# Patient Record
Sex: FEMALE | Race: WHITE | Employment: FULL TIME
[De-identification: names, ages, dates, MRNs, and addresses within clinical notes are randomized per-mention and may not be internally consistent; named-entity substitution may affect disease eponyms.]

---

## 2017-01-04 ENCOUNTER — OFFICE VISIT (OUTPATIENT)
Dept: OBGYN | Facility: CLINIC | Age: 57
End: 2017-01-04

## 2017-01-04 VITALS
DIASTOLIC BLOOD PRESSURE: 78 MMHG | BODY MASS INDEX: 49.87 KG/M2 | WEIGHT: 254 LBS | SYSTOLIC BLOOD PRESSURE: 124 MMHG | HEIGHT: 60 IN

## 2017-01-04 DIAGNOSIS — Z01.419 PAP SMEAR, AS PART OF ROUTINE GYNECOLOGICAL EXAMINATION: Primary | ICD-10-CM

## 2017-01-04 DIAGNOSIS — Z12.31 VISIT FOR SCREENING MAMMOGRAM: ICD-10-CM

## 2017-01-04 DIAGNOSIS — N92.1 MENORRHAGIA WITH IRREGULAR CYCLE: ICD-10-CM

## 2017-01-04 PROCEDURE — 99396 PREV VISIT EST AGE 40-64: CPT | Performed by: NURSE PRACTITIONER

## 2017-01-04 ASSESSMENT — ENCOUNTER SYMPTOMS
SHORTNESS OF BREATH: 0
BLOOD IN STOOL: 0
VOMITING: 0
COUGH: 0
ABDOMINAL PAIN: 0
CHEST TIGHTNESS: 0
WHEEZING: 0
BACK PAIN: 0
CONSTIPATION: 0
PHOTOPHOBIA: 0
NAUSEA: 0
ABDOMINAL DISTENTION: 0
DIARRHEA: 0
COLOR CHANGE: 0

## 2017-01-17 ENCOUNTER — TELEPHONE (OUTPATIENT)
Dept: OBGYN | Facility: CLINIC | Age: 57
End: 2017-01-17

## 2017-01-18 ENCOUNTER — TELEPHONE (OUTPATIENT)
Dept: OBGYN | Facility: CLINIC | Age: 57
End: 2017-01-18

## 2017-01-18 DIAGNOSIS — R73.01 ELEVATED FASTING BLOOD SUGAR: Primary | ICD-10-CM

## 2017-01-19 PROBLEM — R73.01 ELEVATED FASTING BLOOD SUGAR: Status: ACTIVE | Noted: 2017-01-19

## 2017-01-23 ENCOUNTER — TELEPHONE (OUTPATIENT)
Dept: OBGYN | Facility: CLINIC | Age: 57
End: 2017-01-23

## 2017-03-30 ENCOUNTER — HOSPITAL ENCOUNTER (OUTPATIENT)
Age: 57
Discharge: HOME | End: 2017-03-30
Payer: COMMERCIAL

## 2017-03-30 DIAGNOSIS — I10 HYPERTENSION, BENIGN: Chronic | ICD-10-CM

## 2017-03-30 DIAGNOSIS — R73.01 ELEVATED FASTING BLOOD SUGAR: ICD-10-CM

## 2017-03-30 LAB
CHOLESTEROL/HDL RATIO: 3.2
CHOLESTEROL: 200 MG/DL
ESTIMATED AVERAGE GLUCOSE: 108 MG/DL
HBA1C MFR BLD: 5.4 % (ref 4–6)
HDLC SERPL-MCNC: 63 MG/DL
LDL CHOLESTEROL: 121 MG/DL (ref 0–130)
TRIGL SERPL-MCNC: 82 MG/DL
VLDLC SERPL CALC-MCNC: ABNORMAL MG/DL (ref 1–30)

## 2017-03-30 PROCEDURE — 80061 LIPID PANEL: CPT

## 2017-03-30 PROCEDURE — 83036 HEMOGLOBIN GLYCOSYLATED A1C: CPT

## 2017-03-30 PROCEDURE — 36415 COLL VENOUS BLD VENIPUNCTURE: CPT

## 2018-09-25 ENCOUNTER — HOSPITAL ENCOUNTER (OUTPATIENT)
Age: 58
Discharge: HOME | End: 2018-09-25
Payer: COMMERCIAL

## 2018-09-25 DIAGNOSIS — R53.83 FATIGUE, UNSPECIFIED TYPE: ICD-10-CM

## 2018-09-25 DIAGNOSIS — I10 HYPERTENSION, BENIGN: Chronic | ICD-10-CM

## 2018-09-25 DIAGNOSIS — R73.9 HYPERGLYCEMIA: ICD-10-CM

## 2018-09-25 LAB
ABSOLUTE EOS #: 0.2 K/UL (ref 0–0.4)
ABSOLUTE IMMATURE GRANULOCYTE: ABNORMAL K/UL (ref 0–0.3)
ABSOLUTE LYMPH #: 1.6 K/UL (ref 1–4.8)
ABSOLUTE MONO #: 0.7 K/UL (ref 0.1–1.3)
ALBUMIN SERPL-MCNC: 4.1 G/DL (ref 3.5–5.2)
ALBUMIN/GLOBULIN RATIO: ABNORMAL (ref 1–2.5)
ALP BLD-CCNC: 108 U/L (ref 35–104)
ALT SERPL-CCNC: 37 U/L (ref 5–33)
ANION GAP SERPL CALCULATED.3IONS-SCNC: 13 MMOL/L (ref 9–17)
AST SERPL-CCNC: 21 U/L
BASOPHILS # BLD: 1 % (ref 0–2)
BASOPHILS ABSOLUTE: 0 K/UL (ref 0–0.2)
BILIRUB SERPL-MCNC: 0.46 MG/DL (ref 0.3–1.2)
BUN BLDV-MCNC: 14 MG/DL (ref 6–20)
BUN/CREAT BLD: ABNORMAL (ref 9–20)
CALCIUM SERPL-MCNC: 10.6 MG/DL (ref 8.6–10.4)
CHLORIDE BLD-SCNC: 101 MMOL/L (ref 98–107)
CHOLESTEROL/HDL RATIO: 3.9
CHOLESTEROL: 193 MG/DL
CO2: 27 MMOL/L (ref 20–31)
CREAT SERPL-MCNC: 0.51 MG/DL (ref 0.5–0.9)
DIFFERENTIAL TYPE: ABNORMAL
EOSINOPHILS RELATIVE PERCENT: 3 % (ref 0–4)
ESTIMATED AVERAGE GLUCOSE: 117 MG/DL
GFR AFRICAN AMERICAN: >60 ML/MIN
GFR NON-AFRICAN AMERICAN: >60 ML/MIN
GFR SERPL CREATININE-BSD FRML MDRD: ABNORMAL ML/MIN/{1.73_M2}
GFR SERPL CREATININE-BSD FRML MDRD: ABNORMAL ML/MIN/{1.73_M2}
GLUCOSE BLD-MCNC: 111 MG/DL (ref 70–99)
HBA1C MFR BLD: 5.7 % (ref 4–6)
HCT VFR BLD CALC: 44.7 % (ref 36–46)
HDLC SERPL-MCNC: 50 MG/DL
HEMOGLOBIN: 15.1 G/DL (ref 12–16)
IMMATURE GRANULOCYTES: ABNORMAL %
LDL CHOLESTEROL: 117 MG/DL (ref 0–130)
LYMPHOCYTES # BLD: 23 % (ref 24–44)
MCH RBC QN AUTO: 29.9 PG (ref 26–34)
MCHC RBC AUTO-ENTMCNC: 33.7 G/DL (ref 31–37)
MCV RBC AUTO: 88.9 FL (ref 80–100)
MONOCYTES # BLD: 10 % (ref 1–7)
NRBC AUTOMATED: ABNORMAL PER 100 WBC
PDW BLD-RTO: 14.1 % (ref 11.5–14.9)
PLATELET # BLD: 224 K/UL (ref 150–450)
PLATELET ESTIMATE: ABNORMAL
PMV BLD AUTO: 8.7 FL (ref 6–12)
POTASSIUM SERPL-SCNC: 4.2 MMOL/L (ref 3.7–5.3)
RBC # BLD: 5.03 M/UL (ref 4–5.2)
RBC # BLD: ABNORMAL 10*6/UL
SEG NEUTROPHILS: 63 % (ref 36–66)
SEGMENTED NEUTROPHILS ABSOLUTE COUNT: 4.4 K/UL (ref 1.3–9.1)
SODIUM BLD-SCNC: 141 MMOL/L (ref 135–144)
TOTAL PROTEIN: 7.5 G/DL (ref 6.4–8.3)
TRIGL SERPL-MCNC: 132 MG/DL
VLDLC SERPL CALC-MCNC: NORMAL MG/DL (ref 1–30)
WBC # BLD: 7 K/UL (ref 3.5–11)
WBC # BLD: ABNORMAL 10*3/UL

## 2018-09-25 PROCEDURE — 83036 HEMOGLOBIN GLYCOSYLATED A1C: CPT

## 2018-09-25 PROCEDURE — 36415 COLL VENOUS BLD VENIPUNCTURE: CPT

## 2018-09-25 PROCEDURE — 80053 COMPREHEN METABOLIC PANEL: CPT

## 2018-09-25 PROCEDURE — 85025 COMPLETE CBC W/AUTO DIFF WBC: CPT

## 2018-09-25 PROCEDURE — 80061 LIPID PANEL: CPT

## 2018-10-01 ENCOUNTER — HOSPITAL ENCOUNTER (OUTPATIENT)
Dept: WOMENS IMAGING | Age: 58
Discharge: HOME | End: 2018-10-03
Payer: COMMERCIAL

## 2018-10-01 DIAGNOSIS — Z12.31 SCREENING MAMMOGRAM, ENCOUNTER FOR: ICD-10-CM

## 2018-10-01 PROCEDURE — 77067 SCR MAMMO BI INCL CAD: CPT

## 2019-10-01 ENCOUNTER — HOSPITAL ENCOUNTER (OUTPATIENT)
Age: 59
Discharge: HOME | End: 2019-10-01
Payer: COMMERCIAL

## 2019-10-01 DIAGNOSIS — I10 HYPERTENSION, BENIGN: ICD-10-CM

## 2019-10-01 DIAGNOSIS — R73.03 PREDIABETES: ICD-10-CM

## 2019-10-01 LAB
ANION GAP SERPL CALCULATED.3IONS-SCNC: 10 MMOL/L (ref 9–17)
BUN BLDV-MCNC: 15 MG/DL (ref 6–20)
BUN/CREAT BLD: ABNORMAL (ref 9–20)
CALCIUM SERPL-MCNC: 10.9 MG/DL (ref 8.6–10.4)
CHLORIDE BLD-SCNC: 104 MMOL/L (ref 98–107)
CO2: 27 MMOL/L (ref 20–31)
CREAT SERPL-MCNC: 0.51 MG/DL (ref 0.5–0.9)
ESTIMATED AVERAGE GLUCOSE: 111 MG/DL
GFR AFRICAN AMERICAN: >60 ML/MIN
GFR NON-AFRICAN AMERICAN: >60 ML/MIN
GFR SERPL CREATININE-BSD FRML MDRD: ABNORMAL ML/MIN/{1.73_M2}
GFR SERPL CREATININE-BSD FRML MDRD: ABNORMAL ML/MIN/{1.73_M2}
GLUCOSE BLD-MCNC: 102 MG/DL (ref 70–99)
HBA1C MFR BLD: 5.5 % (ref 4–6)
POTASSIUM SERPL-SCNC: 4.3 MMOL/L (ref 3.7–5.3)
SODIUM BLD-SCNC: 141 MMOL/L (ref 135–144)

## 2019-10-01 PROCEDURE — 36415 COLL VENOUS BLD VENIPUNCTURE: CPT

## 2019-10-01 PROCEDURE — 83036 HEMOGLOBIN GLYCOSYLATED A1C: CPT

## 2019-10-01 PROCEDURE — 80048 BASIC METABOLIC PNL TOTAL CA: CPT

## 2020-06-04 ENCOUNTER — HOSPITAL ENCOUNTER (OUTPATIENT)
Dept: GENERAL RADIOLOGY | Age: 60
Discharge: HOME | End: 2020-06-06
Payer: COMMERCIAL

## 2020-06-04 ENCOUNTER — HOSPITAL ENCOUNTER (OUTPATIENT)
Age: 60
Discharge: HOME | End: 2020-06-06
Payer: COMMERCIAL

## 2020-06-04 PROCEDURE — 73562 X-RAY EXAM OF KNEE 3: CPT

## 2020-06-05 PROBLEM — M17.0 TRICOMPARTMENT OSTEOARTHRITIS OF BOTH KNEES: Chronic | Status: ACTIVE | Noted: 2020-06-05

## 2020-06-07 ENCOUNTER — HOSPITAL ENCOUNTER (OUTPATIENT)
Age: 60
Discharge: HOME | End: 2020-06-07
Payer: COMMERCIAL

## 2020-06-07 LAB
ALBUMIN SERPL-MCNC: 4.5 G/DL (ref 3.5–5.2)
ALBUMIN/GLOBULIN RATIO: 1.4 (ref 1–2.5)
ALP BLD-CCNC: 86 U/L (ref 35–104)
ALT SERPL-CCNC: 18 U/L (ref 5–33)
ANION GAP SERPL CALCULATED.3IONS-SCNC: 16 MMOL/L (ref 9–17)
AST SERPL-CCNC: 11 U/L
BILIRUB SERPL-MCNC: 0.5 MG/DL (ref 0.3–1.2)
BUN BLDV-MCNC: 18 MG/DL (ref 6–20)
BUN/CREAT BLD: ABNORMAL (ref 9–20)
CALCIUM SERPL-MCNC: 10.7 MG/DL (ref 8.6–10.4)
CHLORIDE BLD-SCNC: 104 MMOL/L (ref 98–107)
CHOLESTEROL/HDL RATIO: 3.1
CHOLESTEROL: 200 MG/DL
CO2: 22 MMOL/L (ref 20–31)
CREAT SERPL-MCNC: 0.39 MG/DL (ref 0.5–0.9)
GFR AFRICAN AMERICAN: >60 ML/MIN
GFR NON-AFRICAN AMERICAN: >60 ML/MIN
GFR SERPL CREATININE-BSD FRML MDRD: ABNORMAL ML/MIN/{1.73_M2}
GFR SERPL CREATININE-BSD FRML MDRD: ABNORMAL ML/MIN/{1.73_M2}
GLUCOSE BLD-MCNC: 100 MG/DL (ref 70–99)
HCT VFR BLD CALC: 47.7 % (ref 36.3–47.1)
HDLC SERPL-MCNC: 65 MG/DL
HEMOGLOBIN: 14.9 G/DL (ref 11.9–15.1)
IRON SATURATION: 23 % (ref 20–55)
IRON: 67 UG/DL (ref 37–145)
LDL CHOLESTEROL: 122 MG/DL (ref 0–130)
MCH RBC QN AUTO: 28.1 PG (ref 25.2–33.5)
MCHC RBC AUTO-ENTMCNC: 31.2 G/DL (ref 28.4–34.8)
MCV RBC AUTO: 90 FL (ref 82.6–102.9)
NRBC AUTOMATED: 0 PER 100 WBC
PDW BLD-RTO: 14.6 % (ref 11.8–14.4)
PLATELET # BLD: 313 K/UL (ref 138–453)
PMV BLD AUTO: 11 FL (ref 8.1–13.5)
POTASSIUM SERPL-SCNC: 4 MMOL/L (ref 3.7–5.3)
RBC # BLD: 5.3 M/UL (ref 3.95–5.11)
SODIUM BLD-SCNC: 142 MMOL/L (ref 135–144)
TOTAL IRON BINDING CAPACITY: 293 UG/DL (ref 250–450)
TOTAL PROTEIN: 7.7 G/DL (ref 6.4–8.3)
TRIGL SERPL-MCNC: 65 MG/DL
UNSATURATED IRON BINDING CAPACITY: 226 UG/DL (ref 112–347)
VLDLC SERPL CALC-MCNC: ABNORMAL MG/DL (ref 1–30)
WBC # BLD: 9 K/UL (ref 3.5–11.3)

## 2020-06-07 PROCEDURE — 80053 COMPREHEN METABOLIC PANEL: CPT

## 2020-06-07 PROCEDURE — 80061 LIPID PANEL: CPT

## 2020-06-07 PROCEDURE — 36415 COLL VENOUS BLD VENIPUNCTURE: CPT

## 2020-06-07 PROCEDURE — 83550 IRON BINDING TEST: CPT

## 2020-06-07 PROCEDURE — 85027 COMPLETE CBC AUTOMATED: CPT

## 2020-06-07 PROCEDURE — 83540 ASSAY OF IRON: CPT

## 2020-12-23 ENCOUNTER — HOSPITAL ENCOUNTER (OUTPATIENT)
Dept: MAMMOGRAPHY | Age: 60
Discharge: HOME | End: 2020-12-25
Payer: COMMERCIAL

## 2020-12-23 PROCEDURE — 77063 BREAST TOMOSYNTHESIS BI: CPT

## 2021-01-18 ENCOUNTER — HOSPITAL ENCOUNTER (OUTPATIENT)
Dept: LAB | Age: 61
Setting detail: SPECIMEN
Discharge: HOME | End: 2021-01-18
Payer: COMMERCIAL

## 2021-01-18 DIAGNOSIS — Z01.818 PREOP TESTING: Primary | ICD-10-CM

## 2021-01-18 PROCEDURE — U0005 INFEC AGEN DETEC AMPLI PROBE: HCPCS

## 2021-01-18 PROCEDURE — U0003 INFECTIOUS AGENT DETECTION BY NUCLEIC ACID (DNA OR RNA); SEVERE ACUTE RESPIRATORY SYNDROME CORONAVIRUS 2 (SARS-COV-2) (CORONAVIRUS DISEASE [COVID-19]), AMPLIFIED PROBE TECHNIQUE, MAKING USE OF HIGH THROUGHPUT TECHNOLOGIES AS DESCRIBED BY CMS-2020-01-R: HCPCS

## 2021-01-20 LAB
SARS-COV-2, RAPID: NORMAL
SARS-COV-2: NORMAL
SARS-COV-2: NOT DETECTED
SOURCE: NORMAL

## 2021-01-21 ENCOUNTER — ANESTHESIA EVENT (OUTPATIENT)
Dept: ENDOSCOPY | Age: 61
End: 2021-01-21
Payer: COMMERCIAL

## 2021-01-22 ENCOUNTER — ANESTHESIA (OUTPATIENT)
Dept: ENDOSCOPY | Age: 61
End: 2021-01-22
Payer: COMMERCIAL

## 2021-01-22 ENCOUNTER — HOSPITAL ENCOUNTER (OUTPATIENT)
Age: 61
Setting detail: OUTPATIENT SURGERY
Discharge: HOME | End: 2021-01-22
Attending: SURGERY | Admitting: SURGERY
Payer: COMMERCIAL

## 2021-01-22 VITALS
OXYGEN SATURATION: 96 % | RESPIRATION RATE: 16 BRPM | SYSTOLIC BLOOD PRESSURE: 159 MMHG | TEMPERATURE: 97 F | HEART RATE: 72 BPM | WEIGHT: 230 LBS | BODY MASS INDEX: 45.16 KG/M2 | HEIGHT: 60 IN | DIASTOLIC BLOOD PRESSURE: 76 MMHG

## 2021-01-22 VITALS
TEMPERATURE: 96.8 F | SYSTOLIC BLOOD PRESSURE: 170 MMHG | RESPIRATION RATE: 32 BRPM | DIASTOLIC BLOOD PRESSURE: 91 MMHG | OXYGEN SATURATION: 100 %

## 2021-01-22 PROCEDURE — 3609010300 HC COLONOSCOPY W/BIOPSY SINGLE/MULTIPLE: Performed by: SURGERY

## 2021-01-22 PROCEDURE — 7100000011 HC PHASE II RECOVERY - ADDTL 15 MIN: Performed by: SURGERY

## 2021-01-22 PROCEDURE — 7100000000 HC PACU RECOVERY - FIRST 15 MIN: Performed by: SURGERY

## 2021-01-22 PROCEDURE — 2500000003 HC RX 250 WO HCPCS: Performed by: ANESTHESIOLOGY

## 2021-01-22 PROCEDURE — 7100000001 HC PACU RECOVERY - ADDTL 15 MIN: Performed by: SURGERY

## 2021-01-22 PROCEDURE — 88305 TISSUE EXAM BY PATHOLOGIST: CPT

## 2021-01-22 PROCEDURE — 3700000000 HC ANESTHESIA ATTENDED CARE: Performed by: SURGERY

## 2021-01-22 PROCEDURE — 6360000002 HC RX W HCPCS: Performed by: NURSE ANESTHETIST, CERTIFIED REGISTERED

## 2021-01-22 PROCEDURE — 2580000003 HC RX 258: Performed by: ANESTHESIOLOGY

## 2021-01-22 PROCEDURE — 3700000001 HC ADD 15 MINUTES (ANESTHESIA): Performed by: SURGERY

## 2021-01-22 PROCEDURE — 2709999900 HC NON-CHARGEABLE SUPPLY: Performed by: SURGERY

## 2021-01-22 PROCEDURE — 7100000030 HC ASPR PHASE II RECOVERY - FIRST 15 MIN: Performed by: SURGERY

## 2021-01-22 PROCEDURE — 7100000031 HC ASPR PHASE II RECOVERY - ADDTL 15 MIN: Performed by: SURGERY

## 2021-01-22 PROCEDURE — 7100000010 HC PHASE II RECOVERY - FIRST 15 MIN: Performed by: SURGERY

## 2021-01-22 RX ORDER — SODIUM CHLORIDE 0.9 % (FLUSH) 0.9 %
10 SYRINGE (ML) INJECTION EVERY 12 HOURS SCHEDULED
Status: DISCONTINUED | OUTPATIENT
Start: 2021-01-22 | End: 2021-01-22 | Stop reason: HOSPADM

## 2021-01-22 RX ORDER — ONDANSETRON 2 MG/ML
INJECTION INTRAMUSCULAR; INTRAVENOUS PRN
Status: DISCONTINUED | OUTPATIENT
Start: 2021-01-22 | End: 2021-01-22 | Stop reason: SDUPTHER

## 2021-01-22 RX ORDER — PROPOFOL 10 MG/ML
INJECTION, EMULSION INTRAVENOUS PRN
Status: DISCONTINUED | OUTPATIENT
Start: 2021-01-22 | End: 2021-01-22 | Stop reason: SDUPTHER

## 2021-01-22 RX ORDER — SODIUM CHLORIDE, SODIUM LACTATE, POTASSIUM CHLORIDE, CALCIUM CHLORIDE 600; 310; 30; 20 MG/100ML; MG/100ML; MG/100ML; MG/100ML
INJECTION, SOLUTION INTRAVENOUS CONTINUOUS
Status: DISCONTINUED | OUTPATIENT
Start: 2021-01-22 | End: 2021-01-22 | Stop reason: HOSPADM

## 2021-01-22 RX ORDER — LIDOCAINE HYDROCHLORIDE 10 MG/ML
1 INJECTION, SOLUTION EPIDURAL; INFILTRATION; INTRACAUDAL; PERINEURAL
Status: COMPLETED | OUTPATIENT
Start: 2021-01-22 | End: 2021-01-22

## 2021-01-22 RX ORDER — DIPHENHYDRAMINE HYDROCHLORIDE 50 MG/ML
12.5 INJECTION INTRAMUSCULAR; INTRAVENOUS
Status: DISCONTINUED | OUTPATIENT
Start: 2021-01-22 | End: 2021-01-22 | Stop reason: HOSPADM

## 2021-01-22 RX ORDER — DEXAMETHASONE SODIUM PHOSPHATE 4 MG/ML
INJECTION, SOLUTION INTRA-ARTICULAR; INTRALESIONAL; INTRAMUSCULAR; INTRAVENOUS; SOFT TISSUE PRN
Status: DISCONTINUED | OUTPATIENT
Start: 2021-01-22 | End: 2021-01-22 | Stop reason: SDUPTHER

## 2021-01-22 RX ORDER — LABETALOL HYDROCHLORIDE 5 MG/ML
5 INJECTION, SOLUTION INTRAVENOUS EVERY 10 MIN PRN
Status: DISCONTINUED | OUTPATIENT
Start: 2021-01-22 | End: 2021-01-22 | Stop reason: HOSPADM

## 2021-01-22 RX ORDER — ONDANSETRON 2 MG/ML
4 INJECTION INTRAMUSCULAR; INTRAVENOUS
Status: DISCONTINUED | OUTPATIENT
Start: 2021-01-22 | End: 2021-01-22 | Stop reason: HOSPADM

## 2021-01-22 RX ORDER — MEPERIDINE HYDROCHLORIDE 25 MG/ML
12.5 INJECTION INTRAMUSCULAR; INTRAVENOUS; SUBCUTANEOUS EVERY 5 MIN PRN
Status: DISCONTINUED | OUTPATIENT
Start: 2021-01-22 | End: 2021-01-22 | Stop reason: HOSPADM

## 2021-01-22 RX ORDER — MORPHINE SULFATE 2 MG/ML
2 INJECTION, SOLUTION INTRAMUSCULAR; INTRAVENOUS EVERY 5 MIN PRN
Status: DISCONTINUED | OUTPATIENT
Start: 2021-01-22 | End: 2021-01-22 | Stop reason: HOSPADM

## 2021-01-22 RX ORDER — SODIUM CHLORIDE 0.9 % (FLUSH) 0.9 %
10 SYRINGE (ML) INJECTION PRN
Status: DISCONTINUED | OUTPATIENT
Start: 2021-01-22 | End: 2021-01-22 | Stop reason: HOSPADM

## 2021-01-22 RX ADMIN — SODIUM CHLORIDE, POTASSIUM CHLORIDE, SODIUM LACTATE AND CALCIUM CHLORIDE: 600; 310; 30; 20 INJECTION, SOLUTION INTRAVENOUS at 06:19

## 2021-01-22 RX ADMIN — LIDOCAINE HYDROCHLORIDE 50 ML: 10 INJECTION, SOLUTION EPIDURAL; INFILTRATION; INTRACAUDAL; PERINEURAL at 06:57

## 2021-01-22 RX ADMIN — PROPOFOL 200 MG: 10 INJECTION, EMULSION INTRAVENOUS at 06:57

## 2021-01-22 RX ADMIN — PROPOFOL 50 MG: 10 INJECTION, EMULSION INTRAVENOUS at 06:58

## 2021-01-22 RX ADMIN — PROPOFOL 50 MG: 10 INJECTION, EMULSION INTRAVENOUS at 06:59

## 2021-01-22 RX ADMIN — DEXAMETHASONE SODIUM PHOSPHATE 4 MG: 4 INJECTION, SOLUTION INTRAMUSCULAR; INTRAVENOUS at 07:06

## 2021-01-22 RX ADMIN — ONDANSETRON 4 MG: 2 INJECTION INTRAMUSCULAR; INTRAVENOUS at 07:06

## 2021-01-22 ASSESSMENT — PULMONARY FUNCTION TESTS
PIF_VALUE: 15
PIF_VALUE: 26
PIF_VALUE: 20
PIF_VALUE: 27
PIF_VALUE: 16
PIF_VALUE: 25
PIF_VALUE: 23
PIF_VALUE: 25
PIF_VALUE: 22
PIF_VALUE: 23
PIF_VALUE: 3
PIF_VALUE: 16
PIF_VALUE: 30
PIF_VALUE: 33
PIF_VALUE: 24
PIF_VALUE: 0
PIF_VALUE: 8
PIF_VALUE: 26
PIF_VALUE: 27

## 2021-01-22 ASSESSMENT — ENCOUNTER SYMPTOMS
SORE THROAT: 0
SHORTNESS OF BREATH: 0
GASTROINTESTINAL NEGATIVE: 1
WHEEZING: 0
SHORTNESS OF BREATH: 0
RHINORRHEA: 0
STRIDOR: 0
COUGH: 0

## 2021-01-22 ASSESSMENT — LIFESTYLE VARIABLES: SMOKING_STATUS: 0

## 2021-01-22 ASSESSMENT — PAIN - FUNCTIONAL ASSESSMENT: PAIN_FUNCTIONAL_ASSESSMENT: 0-10

## 2021-01-22 NOTE — H&P
HISTORY and [facility] [id number]       NAME:  Мария Alejandra  MRN: [id number]   YOB: 1960   Date: 2021   Age: 62 y.o. Gender: female     COMPLAINT AND PRESENT HISTORY:   Мария Alejandra is 62 y.o.,  female, undergoing DIAGNOSTIC COLONOSCOPY for POSITIVE FIT per Dr. Tiffanie Loera. Patient had a positive FIT noted on 20. Patient denies all of the following s/s: ABD pain, constipation, diarrhea, nausea, vomiting/hematemesis, fever/chills, recent unintended weight loss, appetite change, melena/hematochezia, dysphagia/pharyngitis/voice change. Has never had a colonoscopy in the past. Hx of umbilical hernia repair in . Review of additional significant medical hx:  HTN: Patient states BP is typically stable. Current medications r/t condition: HCTZ 25 MG QD    PONV: Patient states she has had some nausea post anesthesia. NPO status: Patient states they have been NPO since before midnight. Medications last taken: Yesterday last time medications taken. Anticoagulation status: Patient denies taking any anti-coagulants, including aspirin currently- stopped IBU 7 days prior. Prep fully completed: Yes. Pertinent family hx: Denies family hx of esophageal and colon CA. Denies any personal hx of blood clots (+ family hx of). Denies hx of MRSA infection.  + PONV. Nicotine status: Former smoker- quit 11 years ago.   PAST MEDICAL HISTORY     Past Medical History:   Diagnosis Date    Depression     Hypertension     Hypertension, benign     PONV (postoperative nausea and vomiting)     after hernia surgery    Umbilical hernia        SURGICAL HISTORY       Past Surgical History:   Procedure Laterality Date     SECTION      x2    TONSILLECTOMY      TUBAL LIGATION      UMBILICAL HERNIA REPAIR  4/21/15    WISDOM TOOTH EXTRACTION         SOCIAL HISTORY       Social History     Socioeconomic History    Marital status:      Spouse name: None  Number of children: None    Years of education: None    Highest education level: None   Occupational History    None   Social Needs    Financial resource strain: None    Food insecurity     Worry: None     Inability: None    Transportation needs     Medical: None     Non-medical: None   Tobacco Use    Smoking status: Former Smoker     Packs/day: 0.25     Years: 16.00     Pack years: 4.00     Types: Cigarettes     Start date: 3/15/2013    Smokeless tobacco: Former User     Quit date: 4/15/2013   Substance and Sexual Activity    Alcohol use: No     Alcohol/week: 0.0 standard drinks    Drug use: No    Sexual activity: Yes     Partners: Male   Lifestyle    Physical activity     Days per week: None     Minutes per session: None    Stress: None   Relationships    Social connections     Talks on phone: None     Gets together: None     Attends Mosque service: None     Active member of club or organization: None     Attends meetings of clubs or organizations: None     Relationship status: None    Intimate partner violence     Fear of current or ex partner: None     Emotionally abused: None     Physically abused: None     Forced sexual activity: None   Other Topics Concern    None   Social History Narrative    None       REVIEW OF SYSTEMS      Allergies   Allergen Reactions    Erythromycin        No current facility-administered medications on file prior to encounter. Current Outpatient Medications on File Prior to Encounter   Medication Sig Dispense Refill    ibuprofen (ADVIL;MOTRIN) 200 MG tablet Take 800 mg by mouth every 8 hours as needed for Pain      Glucosamine-Chondroit-Vit C-Mn (GLUCOSAMINE CHONDR 1500 COMPLX PO) Take by mouth      hydrochlorothiazide (HYDRODIURIL) 25 MG tablet TAKE 1 TABLET DAILY 90 tablet 4    ferrous sulfate 325 (65 FE) MG tablet Take 325 mg by mouth daily (with breakfast).  Ascorbic Acid (VITAMIN C) 250 MG tablet Take 250 mg by mouth daily. (Notation: Medications listed above are not currently reconciled at the signing of this H&P note, to be reconciled in pre-op per RN)    Review of Systems   Constitutional: Negative for chills and fever. HENT: Negative for congestion, ear pain, postnasal drip, rhinorrhea and sore throat. Respiratory: Negative for cough, shortness of breath and wheezing. Cardiovascular: Negative for chest pain, palpitations and leg swelling. Gastrointestinal: Negative. Genitourinary: Negative. Neurological: Negative for dizziness. Hematological: Does not bruise/bleed easily. GENERAL PHYSICAL EXAM     Vitals: Review current vital signs per RN flow sheet. GENERAL APPEARANCE:   Roro Horton is 62 y.o.,  female, severely obese, nourished, conscious, alert. Does not appear to be in any distress or pain at this time. Physical Exam   Constitutional: She is oriented to person, place, and time. She appears well-developed and well-nourished. Non-toxic appearance. She does not have a sickly appearance. She does not appear ill. No distress. HENT:   Head: Normocephalic. Right Ear: External ear normal.   Left Ear: External ear normal.   Mouth/Throat: Oropharynx is clear and moist and mucous membranes are normal. No oropharyngeal exudate, posterior oropharyngeal edema, posterior oropharyngeal erythema or tonsillar abscesses. Eyes: Conjunctivae are normal. Right eye exhibits no discharge. Left eye exhibits no discharge. Cardiovascular: Normal rate, regular rhythm, normal heart sounds and intact distal pulses. Pulses:       Radial pulses are 2+ on the right side and 2+ on the left side. Dorsalis pedis pulses are 2+ on the right side and 2+ on the left side. Posterior tibial pulses are 2+ on the right side and 2+ on the left side. Pulmonary/Chest: Effort normal and breath sounds normal. No respiratory distress. She has no wheezes. She has no rales. Abdominal: Soft. Bowel sounds are normal. She exhibits no distension and no mass. There is no abdominal tenderness. There is no rebound and no guarding. Musculoskeletal: Normal range of motion. Right lower leg: She exhibits no tenderness and no swelling. Left lower leg: She exhibits no tenderness and no swelling. Neurological: She is alert and oriented to person, place, and time. Skin: Skin is warm and dry. She is not diaphoretic. Psychiatric: She has a normal mood and affect.  Her behavior is normal.       RECENT LAB WORK     Lab Results   Component Value Date     06/07/2020    K 4.0 06/07/2020     06/07/2020    CO2 22 06/07/2020    BUN 18 06/07/2020    CREATININE 0.39 (L) 06/07/2020    GLUCOSE 100 (H) 06/07/2020    CALCIUM 10.7 (H) 06/07/2020    PROT 7.7 06/07/2020    LABALBU 4.5 06/07/2020    BILITOT 0.50 06/07/2020    ALKPHOS 86 06/07/2020    AST 11 06/07/2020    ALT 18 06/07/2020    LABGLOM >60 06/07/2020    GFRAA >60 06/07/2020     Lab Results   Component Value Date    WBC 9.0 06/07/2020    HGB 14.9 06/07/2020    HCT 47.7 (H) 06/07/2020    MCV 90.0 06/07/2020     06/07/2020     PROVISIONAL DIAGNOSES / SURGERY:      POSITIVE FIT    DIAGNOSTIC COLONOSCOPY     Patient Active Problem List    Diagnosis Date Noted    Tricompartment osteoarthritis of both knees 06/05/2020    Elevated fasting blood sugar 01/19/2017    BMI 45.0-49.9, adult ([facility] [address] 65/59/4718    Umbilical hernia     Hypertension, benign            HOLLY Cardenas CNP on 1/22/2021 at 5:48 AM

## 2021-01-22 NOTE — ANESTHESIA PRE PROCEDURE
Department of Anesthesiology  Preprocedure Note       Name:  Benjamin Ziegler   Age:  62 y.o.  :  1960                                          MRN:  [id number]         Date:  2021      Surgeon: Nancy Uribe):  Selvin Koehler MD    Procedure: Procedure(s):  COLONOSCOPY DIAGNOSTIC    Medications prior to admission:   Prior to Admission medications    Medication Sig Start Date End Date Taking? Authorizing Provider   hydrochlorothiazide (HYDRODIURIL) 25 MG tablet TAKE 1 TABLET DAILY 19  Yes Marnie Edwards MD   ferrous sulfate 325 (65 FE) MG tablet Take 325 mg by mouth daily (with breakfast). Yes Historical Provider, MD   ibuprofen (ADVIL;MOTRIN) 200 MG tablet Take 800 mg by mouth every 8 hours as needed for Pain    Historical Provider, MD   Glucosamine-Chondroit-Vit C-Mn (GLUCOSAMINE CHONDR 1500 COMPLX PO) Take by mouth    Historical Provider, MD   Ascorbic Acid (VITAMIN C) 250 MG tablet Take 250 mg by mouth daily. Historical Provider, MD       Current medications:    Current Facility-Administered Medications   Medication Dose Route Frequency Provider Last Rate Last Admin    sodium chloride flush 0.9 % injection 10 mL  10 mL Intravenous 2 times per day Zainab Dunbar MD        sodium chloride flush 0.9 % injection 10 mL  10 mL Intravenous PRN Zainab Dunbar MD        lidocaine PF 1 % injection 1 mL  1 mL Intradermal Once PRN Zainab Dunbar MD        lactated ringers infusion   Intravenous Continuous Zainab Dunbar  mL/hr at 21 0619 New Bag at 21 1189       Allergies:     Allergies   Allergen Reactions    Erythromycin        Problem List:    Patient Active Problem List   Diagnosis Code    Hypertension, benign V13    Umbilical hernia X70.0    BMI 45.0-49.9, adult (HCC) Z68.42    Elevated fasting blood sugar R73.01    Tricompartment osteoarthritis of both knees M17.0       Past Medical History:        Diagnosis Date    Depression     Hypertension     Hypertension, benign  PONV (postoperative nausea and vomiting)     after hernia surgery    Umbilical hernia        Past Surgical History:        Procedure Laterality Date     SECTION      x2    TONSILLECTOMY      TUBAL LIGATION  6980    UMBILICAL HERNIA REPAIR  4/21/15    WISDOM TOOTH EXTRACTION         Social History:    Social History     Tobacco Use    Smoking status: Former Smoker     Packs/day: 0.25     Years: 16.00     Pack years: 4.00     Types: Cigarettes     Start date: 3/15/2013    Smokeless tobacco: Former User     Quit date: 4/15/2013   Substance Use Topics    Alcohol use: No     Alcohol/week: 0.0 standard drinks                                Counseling given: Not Answered      Vital Signs (Current):   Vitals:    21 0556   BP: (!) 150/71   Pulse: 80   Resp: 18   Temp: 97.3 °F (36.3 °C)   TempSrc: Infrared   SpO2: 100%   Weight: 230 lb (104.3 kg)   Height: 5' (1.524 m)                                              BP Readings from Last 3 Encounters:   21 (!) 150/71   20 126/60   20 (!) 140/82       NPO Status: Time of last liquid consumption:                         Time of last solid consumption:                         Date of last liquid consumption: 21                        Date of last solid food consumption: 21    BMI:   Wt Readings from Last 3 Encounters:   21 230 lb (104.3 kg)   21 235 lb (106.6 kg)   20 234 lb 12.8 oz (106.5 kg)     Body mass index is 44.92 kg/m².     CBC:   Lab Results   Component Value Date    WBC 9.0 2020    RBC 5.30 2020    HGB 14.9 2020    HCT 47.7 2020    MCV 90.0 2020    RDW 14.6 2020     2020     LR    CMP:   Lab Results   Component Value Date     2020    K 4.0 2020     2020    CO2 22 2020    BUN 18 2020    CREATININE 0.39 2020    GFRAA >60 2020    LABGLOM >60 2020    GLUCOSE 100 2020 PROT 7.7 06/07/2020    CALCIUM 10.7 06/07/2020    BILITOT 0.50 06/07/2020    ALKPHOS 86 06/07/2020    AST 11 06/07/2020    ALT 18 06/07/2020       POC Tests: No results for input(s): POCGLU, POCNA, POCK, POCCL, POCBUN, POCHEMO, POCHCT in the last 72 hours. Coags: No results found for: PROTIME, INR, APTT    HCG (If Applicable):   Lab Results   Component Value Date    PREGTESTUR NEGATIVE 04/21/2015        ABGs: No results found for: PHART, PO2ART, ARW5ABJ, BED2QBE, BEART, K4SSUYQL     Type & Screen (If Applicable):  No results found for: LABABO, LABRH    Drug/Infectious Status (If Applicable):  No results found for: HIV, HEPCAB    COVID-19 Screening (If Applicable):   Lab Results   Component Value Date    COVID19 Not Detected 01/18/2021         Anesthesia Evaluation  Patient summary reviewed and Nursing notes reviewed   history of anesthetic complications: PONV.   Airway: Mallampati: II  TM distance: >3 FB   Neck ROM: full  Mouth opening: > = 3 FB Dental: normal exam         Pulmonary:Negative Pulmonary ROS and normal exam  breath sounds clear to auscultation      (-) pneumonia, COPD, asthma, shortness of breath, recent URI, sleep apnea, rhonchi, wheezes, rales, stridor and not a current smoker                           Cardiovascular:  Exercise tolerance: good (>4 METS),   (+) hypertension: no interval change,     (-) pacemaker, valvular problems/murmurs, past MI, CAD, CABG/stent, dysrhythmias,  angina,  CHF, orthopnea, PND,  GARCIA, murmur, weak pulses,  friction rub, systolic click, carotid bruit,  JVD and peripheral edema    ECG reviewed  Rhythm: regular  Rate: normal           Beta Blocker:  Not on Beta Blocker         Neuro/Psych:      (-) seizures, neuromuscular disease, TIA, CVA, headaches, psychiatric history and depression/anxiety            GI/Hepatic/Renal: Neg GI/Hepatic/Renal ROS       (-) hiatal hernia, GERD, PUD, hepatitis, liver disease, no renal disease, bowel prep and no morbid obesity

## 2021-01-22 NOTE — ANESTHESIA POSTPROCEDURE EVALUATION
POST- ANESTHESIA EVALUATION       Pt Name: Inez Saldivar  MRN: [id number]  YOB: 1960  Date of evaluation: 1/22/2021  Time:  1:16 PM      BP (!) 159/76   Pulse 72   Temp 97 °F (36.1 °C) (Infrared)   Resp 16   Ht 5' (1.524 m)   Wt 230 lb (104.3 kg)   LMP 12/07/2015 (Approximate)   SpO2 96%   BMI 44.92 kg/m²      Consciousness Level  Awake  Cardiopulmonary Status  Stable  Pain Adequately Treated YES  Nausea / Vomiting  NO  Adequate Hydration  YES  Anesthesia Related Complications NONE      Electronically signed by Savanna Meza MD on 1/22/2021 at 1:16 PM       Department of Anesthesiology  Postprocedure Note    Patient: Inez Saldivar  MRN: [id number]  YOB: 1960  Date of evaluation: 1/22/2021  Time:  1:16 PM     Procedure Summary     Date: 01/22/21 Room / Location: [place] [address] 03 / 250 [facility]    Anesthesia Start: 8323 Anesthesia Stop: 0739    Procedure: COLONOSCOPY WITH BIOPSY (N/A Anus) Diagnosis: (POSITIVE FIT)    Surgeons: Shyam Mcmahon MD Responsible Provider: Savanna Meza MD    Anesthesia Type: general ASA Status: 2          Anesthesia Type: general    Damaso Phase I: Damaso Score: 10    Damaso Phase II: Damaso Score: 10    Last vitals: Reviewed and per EMR flowsheets.        Anesthesia Post Evaluation

## 2021-01-22 NOTE — OP NOTE
Operative Note      Patient: Maco Parada  YOB: 1960  MRN: [id number]    Date of Procedure: 1/22/2021    PROCEDURE NOTE    DATE OF PROCEDURE: 1/22/2021    SURGEON: Lizette Vogt MD    ASSISTANT: None    PREOPERATIVE DIAGNOSIS: Positive fit test    POSTOPERATIVE DIAGNOSIS: Perianal skin moisture type changes. Prominent external hemorrhoids. Rectosigmoid polyp. Sigmoid diverticulosis. Submucosal lipoma ascending colon. OPERATION: Total colonoscopy to cecum with rectosigmoid polypectomy with large cold biopsy forceps. ANESTHESIA: General    ESTIMATED BLOOD LOSS: None    COMPLICATIONS: None     SPECIMENS: Rectosigmoid polyps    HISTORY: The patient is a 62y.o. year old female with history of above preop diagnosis. I recommended colonoscopy with possible biopsy or polypectomy and I explained the risk, benefits, expected outcome, and alternatives to the procedure. Risks included but are not limited to bleeding, infection, respiratory distress, hypotension, and perforation of the colon and possibility of missing a lesion. The patient understands and is in agreement. PROCEDURE: The patient was given IV conscious sedation. The patient's SPO2 remained above 90% throughout the procedure. Digital rectal exam was normal.  The colonoscope was inserted through the anus into the rectum and advanced under direct vision to the cecum without difficulty. Terminal ileum was examined for approximately 2 inches. The prep was good. Findings:    Cecum/Ascending colon: abnormal: Submucosal lipoma ascending colon. Transverse colon: normal    Descending/Sigmoid colon: abnormal: Sigmoid diverticulosis. Rectosigmoid polyps removed with large cold biopsy forceps. Rectum/Anus: examined in normal and retroflexed positions and was abnormal: Prominent external hemorrhoids. Moisture. No region where the skin tears easily. Dibucaine ointment was applied.     Withdrawal Time was (minutes): 18 Next screening colonoscopy: 3 years. If screening is less than 10 years the recommended reason is due:polyps    The colon was decompressed. While withdrawing the scope the above findings were verified and the scope was removed. The patient tolerated the procedure and conscious sedation without unusual events. In the recovery room patient was examined and remains hemodynamically stable. Discharge home when criteria met. Recommendations/Plan:   1. To complete the work-up I will schedule the patient for EGD. 2. Discussed with the family  3. High fiber diet   4. Precautions to avoid constipation  5. Local perianal skin care discussed with the family. Symptomatic treatment for hemorrhoids.     Electronically signed by Adam García MD  on 1/22/2021 at 7:42 AM

## 2021-01-25 LAB — SURGICAL PATHOLOGY REPORT: NORMAL

## 2021-03-05 ENCOUNTER — ANESTHESIA EVENT (OUTPATIENT)
Dept: OPERATING ROOM | Age: 61
End: 2021-03-05
Payer: COMMERCIAL

## 2021-03-07 ENCOUNTER — HOSPITAL ENCOUNTER (OUTPATIENT)
Age: 61
Setting detail: SPECIMEN
Discharge: HOME | End: 2021-03-07
Payer: COMMERCIAL

## 2021-03-07 ENCOUNTER — HOSPITAL ENCOUNTER (OUTPATIENT)
Dept: LAB | Age: 61
Setting detail: SPECIMEN
Discharge: HOME | End: 2021-03-07
Payer: COMMERCIAL

## 2021-03-07 DIAGNOSIS — Z01.818 PRE-OP TESTING: ICD-10-CM

## 2021-03-07 DIAGNOSIS — Z01.818 PRE-OP TESTING: Primary | ICD-10-CM

## 2021-03-08 LAB
SARS-COV-2: NORMAL
SARS-COV-2: NOT DETECTED
SOURCE: NORMAL

## 2021-03-08 NOTE — PROGRESS NOTES
Preoperative Instructions:    Stop eating solid foods at midnight the night prior to your surgery. Stop drinking clear liquids at midnight the night prior to your surgery. Arrive at the surgery center (3rd entrance) on _4-15-77______________ by ____0630-0645___________. Please stop any blood thinning medications as directed by your surgeon or prescribing physician. Failure to stop certain medications may interfere with your scheduled surgery. These may include: Aspirin, Coumadin, Plavix, NSAIDS (Motrin, Aleve, Advil, Mobic, Celebrex), Eliquis, Pradaxa, Xarelto, Fish oil, and herbal supplements. HOLD IBUPROFEN  As instructed by Dr. Jolene Markham. You may continue the rest of your medications through the night before surgery unless instructed otherwise. Day of surgery please take only the following medication(s) with a small sip of water:None needed      Please shower with antibacterial soap and water. Reminders:  -If you are going home the day of your procedure, you will need a family member or friend to stay nearby and available by phone during the procedure and drive you home after your procedure. Your  must be 25years of age or older and able to sign off on your discharge instructions.    -If you are going home the same day of your surgery, someone must remain with you for the first 24 hours after your surgery if you receive sedation or anesthesia.      -Please do not wear any jewelery,contacts or body piercing the day of surgery

## 2021-03-11 ENCOUNTER — HOSPITAL ENCOUNTER (OUTPATIENT)
Age: 61
Setting detail: OUTPATIENT SURGERY
Discharge: HOME | End: 2021-03-11
Attending: SURGERY | Admitting: SURGERY
Payer: COMMERCIAL

## 2021-03-11 ENCOUNTER — ANESTHESIA (OUTPATIENT)
Dept: OPERATING ROOM | Age: 61
End: 2021-03-11
Payer: COMMERCIAL

## 2021-03-11 VITALS
BODY MASS INDEX: 46.33 KG/M2 | HEIGHT: 60 IN | DIASTOLIC BLOOD PRESSURE: 91 MMHG | OXYGEN SATURATION: 94 % | RESPIRATION RATE: 12 BRPM | WEIGHT: 236 LBS | HEART RATE: 69 BPM | SYSTOLIC BLOOD PRESSURE: 161 MMHG | TEMPERATURE: 96.7 F

## 2021-03-11 VITALS
RESPIRATION RATE: 25 BRPM | DIASTOLIC BLOOD PRESSURE: 99 MMHG | OXYGEN SATURATION: 95 % | TEMPERATURE: 97.2 F | SYSTOLIC BLOOD PRESSURE: 228 MMHG

## 2021-03-11 PROCEDURE — 6370000000 HC RX 637 (ALT 250 FOR IP): Performed by: NURSE ANESTHETIST, CERTIFIED REGISTERED

## 2021-03-11 PROCEDURE — 6370000000 HC RX 637 (ALT 250 FOR IP): Performed by: SURGERY

## 2021-03-11 PROCEDURE — 6360000002 HC RX W HCPCS: Performed by: NURSE ANESTHETIST, CERTIFIED REGISTERED

## 2021-03-11 PROCEDURE — 2709999900 HC NON-CHARGEABLE SUPPLY: Performed by: SURGERY

## 2021-03-11 PROCEDURE — 3700000001 HC ADD 15 MINUTES (ANESTHESIA): Performed by: SURGERY

## 2021-03-11 PROCEDURE — 7100000010 HC PHASE II RECOVERY - FIRST 15 MIN: Performed by: SURGERY

## 2021-03-11 PROCEDURE — 6370000000 HC RX 637 (ALT 250 FOR IP)

## 2021-03-11 PROCEDURE — 88305 TISSUE EXAM BY PATHOLOGIST: CPT

## 2021-03-11 PROCEDURE — 3609012400 HC EGD TRANSORAL BIOPSY SINGLE/MULTIPLE: Performed by: SURGERY

## 2021-03-11 PROCEDURE — 7100000011 HC PHASE II RECOVERY - ADDTL 15 MIN: Performed by: SURGERY

## 2021-03-11 PROCEDURE — 2580000003 HC RX 258: Performed by: ANESTHESIOLOGY

## 2021-03-11 PROCEDURE — 2500000003 HC RX 250 WO HCPCS

## 2021-03-11 PROCEDURE — 2500000003 HC RX 250 WO HCPCS: Performed by: NURSE ANESTHETIST, CERTIFIED REGISTERED

## 2021-03-11 PROCEDURE — 6360000002 HC RX W HCPCS

## 2021-03-11 PROCEDURE — 3700000000 HC ANESTHESIA ATTENDED CARE: Performed by: SURGERY

## 2021-03-11 RX ORDER — SCOLOPAMINE TRANSDERMAL SYSTEM 1 MG/1
PATCH, EXTENDED RELEASE TRANSDERMAL
Status: COMPLETED
Start: 2021-03-11 | End: 2021-03-11

## 2021-03-11 RX ORDER — SODIUM CHLORIDE 0.9 % (FLUSH) 0.9 %
10 SYRINGE (ML) INJECTION PRN
Status: DISCONTINUED | OUTPATIENT
Start: 2021-03-11 | End: 2021-03-11 | Stop reason: HOSPADM

## 2021-03-11 RX ORDER — SCOLOPAMINE TRANSDERMAL SYSTEM 1 MG/1
1 PATCH, EXTENDED RELEASE TRANSDERMAL ONCE
Status: CANCELLED | OUTPATIENT
Start: 2021-03-11 | End: 2021-03-11

## 2021-03-11 RX ORDER — DIPHENHYDRAMINE HYDROCHLORIDE 50 MG/ML
12.5 INJECTION INTRAMUSCULAR; INTRAVENOUS
Status: DISCONTINUED | OUTPATIENT
Start: 2021-03-11 | End: 2021-03-11 | Stop reason: HOSPADM

## 2021-03-11 RX ORDER — MORPHINE SULFATE 2 MG/ML
2 INJECTION, SOLUTION INTRAMUSCULAR; INTRAVENOUS EVERY 5 MIN PRN
Status: DISCONTINUED | OUTPATIENT
Start: 2021-03-11 | End: 2021-03-11 | Stop reason: HOSPADM

## 2021-03-11 RX ORDER — LABETALOL 20 MG/4 ML (5 MG/ML) INTRAVENOUS SYRINGE
5 EVERY 10 MIN PRN
Status: DISCONTINUED | OUTPATIENT
Start: 2021-03-11 | End: 2021-03-11 | Stop reason: HOSPADM

## 2021-03-11 RX ORDER — APREPITANT 40 MG/1
CAPSULE ORAL
Status: COMPLETED
Start: 2021-03-11 | End: 2021-03-11

## 2021-03-11 RX ORDER — PROMETHAZINE HYDROCHLORIDE 25 MG/ML
12.5 INJECTION, SOLUTION INTRAMUSCULAR; INTRAVENOUS
Status: DISCONTINUED | OUTPATIENT
Start: 2021-03-11 | End: 2021-03-11 | Stop reason: HOSPADM

## 2021-03-11 RX ORDER — MEPERIDINE HYDROCHLORIDE 50 MG/ML
12.5 INJECTION INTRAMUSCULAR; INTRAVENOUS; SUBCUTANEOUS EVERY 5 MIN PRN
Status: DISCONTINUED | OUTPATIENT
Start: 2021-03-11 | End: 2021-03-11 | Stop reason: HOSPADM

## 2021-03-11 RX ORDER — SODIUM CHLORIDE 9 MG/ML
INJECTION, SOLUTION INTRAVENOUS CONTINUOUS
Status: DISCONTINUED | OUTPATIENT
Start: 2021-03-11 | End: 2021-03-11 | Stop reason: HOSPADM

## 2021-03-11 RX ORDER — 0.9 % SODIUM CHLORIDE 0.9 %
500 INTRAVENOUS SOLUTION INTRAVENOUS
Status: DISCONTINUED | OUTPATIENT
Start: 2021-03-11 | End: 2021-03-11 | Stop reason: HOSPADM

## 2021-03-11 RX ORDER — APREPITANT 40 MG/1
40 CAPSULE ORAL ONCE
Status: CANCELLED | OUTPATIENT
Start: 2021-03-11 | End: 2021-03-11

## 2021-03-11 RX ORDER — OXYCODONE HYDROCHLORIDE AND ACETAMINOPHEN 5; 325 MG/1; MG/1
2 TABLET ORAL PRN
Status: DISCONTINUED | OUTPATIENT
Start: 2021-03-11 | End: 2021-03-11 | Stop reason: HOSPADM

## 2021-03-11 RX ORDER — SODIUM CHLORIDE, SODIUM LACTATE, POTASSIUM CHLORIDE, CALCIUM CHLORIDE 600; 310; 30; 20 MG/100ML; MG/100ML; MG/100ML; MG/100ML
INJECTION, SOLUTION INTRAVENOUS CONTINUOUS
Status: DISCONTINUED | OUTPATIENT
Start: 2021-03-11 | End: 2021-03-11 | Stop reason: HOSPADM

## 2021-03-11 RX ORDER — LIDOCAINE HYDROCHLORIDE 10 MG/ML
INJECTION, SOLUTION EPIDURAL; INFILTRATION; INTRACAUDAL; PERINEURAL
Status: COMPLETED
Start: 2021-03-11 | End: 2021-03-11

## 2021-03-11 RX ORDER — SODIUM CHLORIDE 0.9 % (FLUSH) 0.9 %
10 SYRINGE (ML) INJECTION EVERY 12 HOURS SCHEDULED
Status: DISCONTINUED | OUTPATIENT
Start: 2021-03-11 | End: 2021-03-11 | Stop reason: HOSPADM

## 2021-03-11 RX ORDER — MIDAZOLAM HYDROCHLORIDE 1 MG/ML
INJECTION INTRAMUSCULAR; INTRAVENOUS PRN
Status: DISCONTINUED | OUTPATIENT
Start: 2021-03-11 | End: 2021-03-11 | Stop reason: SDUPTHER

## 2021-03-11 RX ORDER — OXYCODONE HYDROCHLORIDE AND ACETAMINOPHEN 5; 325 MG/1; MG/1
1 TABLET ORAL PRN
Status: DISCONTINUED | OUTPATIENT
Start: 2021-03-11 | End: 2021-03-11 | Stop reason: HOSPADM

## 2021-03-11 RX ORDER — LIDOCAINE HYDROCHLORIDE 10 MG/ML
INJECTION, SOLUTION EPIDURAL; INFILTRATION; INTRACAUDAL; PERINEURAL PRN
Status: DISCONTINUED | OUTPATIENT
Start: 2021-03-11 | End: 2021-03-11 | Stop reason: SDUPTHER

## 2021-03-11 RX ORDER — PROPOFOL 10 MG/ML
INJECTION, EMULSION INTRAVENOUS PRN
Status: DISCONTINUED | OUTPATIENT
Start: 2021-03-11 | End: 2021-03-11 | Stop reason: SDUPTHER

## 2021-03-11 RX ORDER — LIDOCAINE HYDROCHLORIDE 20 MG/ML
15 SOLUTION OROPHARYNGEAL ONCE
Status: COMPLETED | OUTPATIENT
Start: 2021-03-11 | End: 2021-03-11

## 2021-03-11 RX ORDER — LIDOCAINE HYDROCHLORIDE 10 MG/ML
INJECTION, SOLUTION INFILTRATION; PERINEURAL
Status: DISCONTINUED
Start: 2021-03-11 | End: 2021-03-11 | Stop reason: WASHOUT

## 2021-03-11 RX ORDER — ONDANSETRON 2 MG/ML
4 INJECTION INTRAMUSCULAR; INTRAVENOUS
Status: DISCONTINUED | OUTPATIENT
Start: 2021-03-11 | End: 2021-03-11 | Stop reason: HOSPADM

## 2021-03-11 RX ADMIN — PROPOFOL 20 MG: 10 INJECTION, EMULSION INTRAVENOUS at 08:04

## 2021-03-11 RX ADMIN — APREPITANT 40 MG: 40 CAPSULE ORAL at 07:25

## 2021-03-11 RX ADMIN — PROPOFOL 20 MG: 10 INJECTION, EMULSION INTRAVENOUS at 08:03

## 2021-03-11 RX ADMIN — PROPOFOL 50 MG: 10 INJECTION, EMULSION INTRAVENOUS at 08:00

## 2021-03-11 RX ADMIN — LIDOCAINE HYDROCHLORIDE 5 ML: 10 INJECTION, SOLUTION EPIDURAL; INFILTRATION; INTRACAUDAL; PERINEURAL at 07:52

## 2021-03-11 RX ADMIN — FAMOTIDINE 20 MG: 10 INJECTION INTRAVENOUS at 07:54

## 2021-03-11 RX ADMIN — PROPOFOL 20 MG: 10 INJECTION, EMULSION INTRAVENOUS at 08:06

## 2021-03-11 RX ADMIN — PROPOFOL 20 MG: 10 INJECTION, EMULSION INTRAVENOUS at 08:01

## 2021-03-11 RX ADMIN — LIDOCAINE HYDROCHLORIDE 50 MG: 10 INJECTION, SOLUTION EPIDURAL; INFILTRATION; INTRACAUDAL; PERINEURAL at 07:59

## 2021-03-11 RX ADMIN — PROPOFOL 20 MG: 10 INJECTION, EMULSION INTRAVENOUS at 08:08

## 2021-03-11 RX ADMIN — PROPOFOL 50 MG: 10 INJECTION, EMULSION INTRAVENOUS at 07:59

## 2021-03-11 RX ADMIN — SODIUM CHLORIDE, POTASSIUM CHLORIDE, SODIUM LACTATE AND CALCIUM CHLORIDE: 600; 310; 30; 20 INJECTION, SOLUTION INTRAVENOUS at 07:53

## 2021-03-11 RX ADMIN — MIDAZOLAM HYDROCHLORIDE 2 MG: 1 INJECTION, SOLUTION INTRAMUSCULAR; INTRAVENOUS at 07:57

## 2021-03-11 RX ADMIN — LIDOCAINE HYDROCHLORIDE 15 ML: 20 SOLUTION ORAL; TOPICAL at 07:20

## 2021-03-11 RX ADMIN — PROPOFOL 20 MG: 10 INJECTION, EMULSION INTRAVENOUS at 08:02

## 2021-03-11 RX ADMIN — BENZOCAINE, BUTAMBEN, AND TETRACAINE HYDROCHLORIDE 1 SPRAY: .028; .004; .004 AEROSOL, SPRAY TOPICAL at 07:59

## 2021-03-11 ASSESSMENT — PULMONARY FUNCTION TESTS
PIF_VALUE: 1
PIF_VALUE: 1
PIF_VALUE: 0
PIF_VALUE: 1
PIF_VALUE: 0
PIF_VALUE: 1

## 2021-03-11 ASSESSMENT — PAIN - FUNCTIONAL ASSESSMENT: PAIN_FUNCTIONAL_ASSESSMENT: 0-10

## 2021-03-11 NOTE — OP NOTE
Operative Note      Patient: Chemo Bowens  YOB: 1960  MRN: [id number]    Date of Procedure: 3/11/2021    ESOPHAGOGASTRODUODENOSCOPY   ( EGD )  DATE OF PROCEDURE: 3/11/2021     SURGEON: Loree Pozo MD    ASSISTANT: None    PREOPERATIVE DIAGNOSIS: Positive fit test.  GI bleed. POSTOPERATIVE DIAGNOSIS: Irregular Z-line. Biopsies obtained. Small sliding-type hiatal hernia. Gastric polyps likely fundic gland polyps. No evidence of upper GI bleed. OPERATION: Upper GI endoscopy with Biopsy    ANESTHESIA: Moderate Sedation     ESTIMATED BLOOD LOSS: None    COMPLICATIONS: None. SPECIMENS:  Was Obtained: GE junction biopsy. Gastric polyp biopsy. HISTORY: The patient is a 62y.o. year old female with history of above preop diagnosis. I recommended esophagogastroduodenoscopy with possible biopsy and I explained the risk, benefits, expected outcome, and alternatives to the procedure. Risks included but are not limited to bleeding, infection, respiratory distress, hypotension, and perforation of the esophagus, stomach, or duodenum. Patient understands and is in agreement. PROCEDURE: The patient was given IV conscious sedation. The patient's SPO2 remained above 90% throughout the procedure. Cetacaine spray given. Patient placed in left lateral position. Olympus  videogastroscope was inserted orally under vision into the esophagus without difficulty and advanced into the stomach then through the pylorus up to the second part of duodenum. Findings:    Retropharyngeal area was grossly normal appearing    Esophagus: abnormal: Irregular Z-line. Biopsies obtained. Small sliding-type hiatal hernia. Stomach:    Fundus and Cardia Examined in Retroflexed View: normal    Body: abnormal: Multiple gastric polyps likely fundic gland polyps. Biopsies obtained.     Antrum: normal    Duodenum:     Descending: normal    Bulb: normal    While withdrawing the scope the above findings were verified and the scope was removed. The patient tolerated the procedure and conscious sedation without unusual events. In the recovery room patient was examined and remains hemodynamically stable. Discharge home when criteria met. Recommendations/Plan:   1. F/U Biopsies  2. F/U In Office as instructed  3.  Discussed with the family    Electronically signed by Zulay Sommers MD  on 3/11/2021 at 8:15 AM

## 2021-03-11 NOTE — ANESTHESIA PRE PROCEDURE
Department of Anesthesiology  Preprocedure Note       Name:  Inez Saldivar   Age:  62 y.o.  :  1960                                          MRN:  [id number]         Date:  3/11/2021      Surgeon: Edda Leggett):  Shyam Mcmahon MD    Procedure: Procedure(s):  EGD BIOPSY    Medications prior to admission:   Prior to Admission medications    Medication Sig Start Date End Date Taking? Authorizing Provider   hydroCHLOROthiazide (HYDRODIURIL) 25 MG tablet TAKE 1 TABLET DAILY 21  Yes Shiela Mays MD   ibuprofen (ADVIL;MOTRIN) 200 MG tablet Take 800 mg by mouth every 8 hours as needed for Pain   Yes Historical Provider, MD   Glucosamine-Chondroit-Vit C-Mn (GLUCOSAMINE CHONDR 1500 COMPLX PO) Take by mouth   Yes Historical Provider, MD   ferrous sulfate 325 (65 FE) MG tablet Take 325 mg by mouth daily (with breakfast). Yes Historical Provider, MD   Ascorbic Acid (VITAMIN C) 250 MG tablet Take 250 mg by mouth daily. Yes Historical Provider, MD       Current medications:    Current Facility-Administered Medications   Medication Dose Route Frequency Provider Last Rate Last Admin    lidocaine viscous hcl (XYLOCAINE) 2 % solution 15 mL  15 mL Mouth/Throat Once Shyam Mcmahon MD        0.9 % sodium chloride infusion   Intravenous Continuous Wero Haji MD        lactated ringers infusion   Intravenous Continuous Wero Haji MD        sodium chloride flush 0.9 % injection 10 mL  10 mL Intravenous 2 times per day Wero Haji MD        sodium chloride flush 0.9 % injection 10 mL  10 mL Intravenous PRN Wero Haji MD        butamben-tetracaine-benzocaine (CETACAINE) 2-2-14 % spray                Allergies:     Allergies   Allergen Reactions    Erythromycin Nausea Only     Stomach pain       Problem List:    Patient Active Problem List   Diagnosis Code    Hypertension, benign J92    Umbilical hernia Y50.2    BMI 45.0-49.9, adult (HCC) Z68.42    Elevated fasting blood sugar R73.01    Tricompartment 06/07/2020    BUN 18 06/07/2020    CREATININE 0.39 06/07/2020    GFRAA >60 06/07/2020    LABGLOM >60 06/07/2020    GLUCOSE 100 06/07/2020    PROT 7.7 06/07/2020    CALCIUM 10.7 06/07/2020    BILITOT 0.50 06/07/2020    ALKPHOS 86 06/07/2020    AST 11 06/07/2020    ALT 18 06/07/2020       POC Tests: No results for input(s): POCGLU, POCNA, POCK, POCCL, POCBUN, POCHEMO, POCHCT in the last 72 hours. Coags: No results found for: PROTIME, INR, APTT    HCG (If Applicable):   Lab Results   Component Value Date    PREGTESTUR NEGATIVE 04/21/2015        ABGs: No results found for: PHART, PO2ART, TIP5JCJ, CAG2IKS, BEART, Q4QHXGHP     Type & Screen (If Applicable):  No results found for: LABABO, LABRH    Drug/Infectious Status (If Applicable):  No results found for: HIV, HEPCAB    COVID-19 Screening (If Applicable):   Lab Results   Component Value Date    COVID19 Not Detected 03/07/2021         Anesthesia Evaluation  Patient summary reviewed and Nursing notes reviewed   history of anesthetic complications: PONV. Airway: Mallampati: I  TM distance: >3 FB   Neck ROM: full  Mouth opening: > = 3 FB Dental: normal exam         Pulmonary:Negative Pulmonary ROS and normal exam                              ROS comment: -QUIT SMOKING 13 YEARS AGO   Cardiovascular:    (+) hypertension:, GARCIA:,                   Neuro/Psych:   Negative Neuro/Psych ROS              GI/Hepatic/Renal:   (+) morbid obesity          Endo/Other:    (+) : arthritis: OA., .                  ROS comment: -NPO AFTER MIDNIGHT  -ALLERGIES - ERYTHROMYCIN Abdominal:           Vascular:           ROS comment: -ANEMIA. Anesthesia Plan      MAC     ASA 2       Induction: intravenous. MIPS: Postoperative opioids intended and Prophylactic antiemetics administered. Anesthetic plan and risks discussed with patient. Plan discussed with CRNA.     Attending anesthesiologist reviewed and agrees with Pre Eval content            BAUTISTA

## 2021-03-11 NOTE — ANESTHESIA POSTPROCEDURE EVALUATION
Department of Anesthesiology  Postprocedure Note    Patient: Chemo Bowens  MRN: [id number]  YOB: 1960  Date of evaluation: 3/11/2021  Time:  8:24 AM     Procedure Summary     Date: 03/11/21 Room / Location: [place] [address] / [facility]    Anesthesia Start: 5140 Anesthesia Stop: 0818    Procedure: EGD BIOPSY OF GE JUNCTION AND GASTRIC POLYPECTOMY (N/A ) Diagnosis: (POSITIVE FIT)    Surgeons: Loree Pozo MD Responsible Provider: Kya Sousa MD    Anesthesia Type: MAC ASA Status: 2          Anesthesia Type: MAC    Damaso Phase I: Damaso Score: 10    Damaso Phase II: Damaso Score: 9    Last vitals: Reviewed and per EMR flowsheets.        Anesthesia Post Evaluation    Patient location during evaluation: PACU  Patient participation: complete - patient participated  Level of consciousness: awake and alert  Airway patency: patent  Nausea & Vomiting: no nausea and no vomiting  Complications: no  Cardiovascular status: hemodynamically stable  Respiratory status: nasal cannula and spontaneous ventilation  Hydration status: euvolemic

## 2021-03-11 NOTE — H&P
Procedure History and Physical    Pre-Procedural Diagnosis: GI bleed/positive fit test    Indications:  same    Procedure Planned: EGD possible biopsy    History Obtained From:  patient    HISTORY OF PRESENT ILLNESS:       The patient is a 62 y.o. female who presents for the above procedure.         Past Medical History:    Past Medical History:   Diagnosis Date    Depression     Hypertension     Hypertension, benign     Obese     PONV (postoperative nausea and vomiting)     after hernia surgery    Umbilical hernia        Past Surgical History:    Past Surgical History:   Procedure Laterality Date     SECTION      x2    COLONOSCOPY N/A 2021    COLONOSCOPY WITH BIOPSY performed by Adam García [facility] at [facility] 1  4/21/15    WISDOM TOOTH EXTRACTION         Medications:  Current Facility-Administered Medications   Medication Dose Route Frequency Provider Last Rate Last Admin    lidocaine viscous hcl (XYLOCAINE) 2 % solution 15 mL  15 mL Mouth/Throat Once Adam García MD        0.9 % sodium chloride infusion   Intravenous Continuous Gaynel Nyhan, MD        lactated ringers infusion   Intravenous Continuous Gaynel Nyhan, MD        sodium chloride flush 0.9 % injection 10 mL  10 mL Intravenous 2 times per day Gaynel Nyhan, MD        sodium chloride flush 0.9 % injection 10 mL  10 mL Intravenous PRN Gaynel Nyhan, MD        butamben-tetracaine-benzocaine (CETACAINE) 2-2-14 % spray             meperidine (DEMEROL) injection 12.5 mg  12.5 mg Intravenous Q5 Min PRN Melissa Hinkle MD        morphine (PF) injection 2 mg  2 mg Intravenous Q5 Min PRN Melissa Hinkle MD        HYDROmorphone (DILAUDID) injection 0.5 mg  0.5 mg Intravenous Q5 Min PRN Melissa Hinkle MD        HYDROmorphone (DILAUDID) injection 0.25 mg  0.25 mg Intravenous Q5 Min PRN Melissa Hinkle MD        HYDROmorphone (DILAUDID) injection 0.5 mg  0.5 mg

## 2021-03-15 LAB — SURGICAL PATHOLOGY REPORT: NORMAL

## 2021-05-29 ENCOUNTER — HOSPITAL ENCOUNTER (OUTPATIENT)
Age: 61
Discharge: HOME | End: 2021-05-29
Payer: COMMERCIAL

## 2021-05-29 DIAGNOSIS — I10 HYPERTENSION, BENIGN: ICD-10-CM

## 2021-05-29 DIAGNOSIS — R73.03 PREDIABETES: ICD-10-CM

## 2021-05-29 LAB
ALBUMIN SERPL-MCNC: 4.1 G/DL (ref 3.5–5.2)
ALBUMIN/GLOBULIN RATIO: 1.5 (ref 1–2.5)
ALP BLD-CCNC: 86 U/L (ref 35–104)
ALT SERPL-CCNC: 15 U/L (ref 5–33)
ANION GAP SERPL CALCULATED.3IONS-SCNC: 9 MMOL/L (ref 9–17)
AST SERPL-CCNC: 16 U/L
BILIRUB SERPL-MCNC: 0.4 MG/DL (ref 0.3–1.2)
BUN BLDV-MCNC: 16 MG/DL (ref 8–23)
BUN/CREAT BLD: ABNORMAL (ref 9–20)
CALCIUM SERPL-MCNC: 10.3 MG/DL (ref 8.6–10.4)
CHLORIDE BLD-SCNC: 105 MMOL/L (ref 98–107)
CHOLESTEROL, FASTING: 175 MG/DL
CHOLESTEROL/HDL RATIO: 2.9
CO2: 26 MMOL/L (ref 20–31)
CREAT SERPL-MCNC: 0.39 MG/DL (ref 0.5–0.9)
GFR AFRICAN AMERICAN: >60 ML/MIN
GFR NON-AFRICAN AMERICAN: >60 ML/MIN
GFR SERPL CREATININE-BSD FRML MDRD: ABNORMAL ML/MIN/{1.73_M2}
GFR SERPL CREATININE-BSD FRML MDRD: ABNORMAL ML/MIN/{1.73_M2}
GLUCOSE BLD-MCNC: 102 MG/DL (ref 70–99)
HDLC SERPL-MCNC: 60 MG/DL
LDL CHOLESTEROL: 100 MG/DL (ref 0–130)
POTASSIUM SERPL-SCNC: 4.3 MMOL/L (ref 3.7–5.3)
SODIUM BLD-SCNC: 140 MMOL/L (ref 135–144)
TOTAL PROTEIN: 6.8 G/DL (ref 6.4–8.3)
TRIGLYCERIDE, FASTING: 77 MG/DL
VLDLC SERPL CALC-MCNC: NORMAL MG/DL (ref 1–30)

## 2021-05-29 PROCEDURE — 80053 COMPREHEN METABOLIC PANEL: CPT

## 2021-05-29 PROCEDURE — 36415 COLL VENOUS BLD VENIPUNCTURE: CPT

## 2021-05-29 PROCEDURE — 80061 LIPID PANEL: CPT

## 2021-05-29 PROCEDURE — 83036 HEMOGLOBIN GLYCOSYLATED A1C: CPT

## 2021-05-30 LAB
ESTIMATED AVERAGE GLUCOSE: 114 MG/DL
HBA1C MFR BLD: 5.6 % (ref 4–6)

## 2022-07-13 ENCOUNTER — HOSPITAL ENCOUNTER (OUTPATIENT)
Age: 62
Discharge: HOME | End: 2022-07-13
Payer: COMMERCIAL

## 2022-07-13 DIAGNOSIS — R73.03 PREDIABETES: ICD-10-CM

## 2022-07-13 DIAGNOSIS — I10 HYPERTENSION, BENIGN: ICD-10-CM

## 2022-07-13 LAB
ALBUMIN SERPL-MCNC: 4.1 G/DL (ref 3.5–5.2)
ALBUMIN/GLOBULIN RATIO: 1.3 (ref 1–2.5)
ALP BLD-CCNC: 98 U/L (ref 35–104)
ALT SERPL-CCNC: 18 U/L (ref 5–33)
ANION GAP SERPL CALCULATED.3IONS-SCNC: 12 MMOL/L (ref 9–17)
AST SERPL-CCNC: 14 U/L
BILIRUB SERPL-MCNC: 0.43 MG/DL (ref 0.3–1.2)
BUN BLDV-MCNC: 19 MG/DL (ref 8–23)
CALCIUM SERPL-MCNC: 10.6 MG/DL (ref 8.6–10.4)
CHLORIDE BLD-SCNC: 104 MMOL/L (ref 98–107)
CHOLESTEROL/HDL RATIO: 3.6
CHOLESTEROL: 219 MG/DL
CO2: 23 MMOL/L (ref 20–31)
CREAT SERPL-MCNC: 0.48 MG/DL (ref 0.5–0.9)
ESTIMATED AVERAGE GLUCOSE: 111 MG/DL
GFR AFRICAN AMERICAN: >60 ML/MIN
GFR NON-AFRICAN AMERICAN: >60 ML/MIN
GFR SERPL CREATININE-BSD FRML MDRD: ABNORMAL ML/MIN/{1.73_M2}
GLUCOSE BLD-MCNC: 92 MG/DL (ref 70–99)
HBA1C MFR BLD: 5.5 % (ref 4–6)
HDLC SERPL-MCNC: 61 MG/DL
LDL CHOLESTEROL: 139 MG/DL (ref 0–130)
POTASSIUM SERPL-SCNC: 3.8 MMOL/L (ref 3.7–5.3)
SODIUM BLD-SCNC: 139 MMOL/L (ref 135–144)
TOTAL PROTEIN: 7.3 G/DL (ref 6.4–8.3)
TRIGL SERPL-MCNC: 95 MG/DL

## 2022-07-13 PROCEDURE — 83036 HEMOGLOBIN GLYCOSYLATED A1C: CPT

## 2022-07-13 PROCEDURE — 80061 LIPID PANEL: CPT

## 2022-07-13 PROCEDURE — 80053 COMPREHEN METABOLIC PANEL: CPT

## 2022-07-13 PROCEDURE — 36415 COLL VENOUS BLD VENIPUNCTURE: CPT

## 2022-08-15 DIAGNOSIS — M17.0 BILATERAL PRIMARY OSTEOARTHRITIS OF KNEE: ICD-10-CM

## 2022-08-15 RX ORDER — MELOXICAM 15 MG/1
15 TABLET ORAL DAILY
Qty: 90 TABLET | Refills: 3 | Status: SHIPPED | OUTPATIENT
Start: 2022-08-15

## 2023-02-09 ENCOUNTER — HOSPITAL ENCOUNTER (OUTPATIENT)
Dept: MAMMOGRAPHY | Age: 63
Discharge: HOME | End: 2023-02-11
Payer: COMMERCIAL

## 2023-02-09 DIAGNOSIS — Z12.31 SCREENING MAMMOGRAM FOR BREAST CANCER: ICD-10-CM

## 2023-02-09 PROCEDURE — 77067 SCR MAMMO BI INCL CAD: CPT

## 2023-02-20 ENCOUNTER — HOSPITAL ENCOUNTER (OUTPATIENT)
Dept: ULTRASOUND IMAGING | Age: 63
Discharge: HOME | End: 2023-02-22
Payer: COMMERCIAL

## 2023-02-20 DIAGNOSIS — N95.0 POSTMENOPAUSAL BLEEDING: ICD-10-CM

## 2023-02-20 PROCEDURE — 76830 TRANSVAGINAL US NON-OB: CPT

## 2023-02-20 PROCEDURE — 76856 US EXAM PELVIC COMPLETE: CPT

## 2023-07-15 ENCOUNTER — HOSPITAL ENCOUNTER (OUTPATIENT)
Age: 63
Discharge: HOME | End: 2023-07-15
Payer: COMMERCIAL

## 2023-07-15 DIAGNOSIS — R73.03 PREDIABETES: ICD-10-CM

## 2023-07-15 DIAGNOSIS — I10 HYPERTENSION, BENIGN: ICD-10-CM

## 2023-07-15 LAB
ALBUMIN SERPL-MCNC: 4.3 G/DL (ref 3.5–5.2)
ALBUMIN/GLOB SERPL: 1.5 {RATIO} (ref 1–2.5)
ALP SERPL-CCNC: 100 U/L (ref 35–104)
ALT SERPL-CCNC: 21 U/L (ref 5–33)
ANION GAP SERPL CALCULATED.3IONS-SCNC: 9 MMOL/L (ref 9–17)
AST SERPL-CCNC: 15 U/L
BASOPHILS # BLD: 0.1 K/UL (ref 0–0.2)
BASOPHILS NFR BLD: 1 % (ref 0–2)
BILIRUB SERPL-MCNC: 0.4 MG/DL (ref 0.3–1.2)
BUN SERPL-MCNC: 11 MG/DL (ref 8–23)
CALCIUM SERPL-MCNC: 10.8 MG/DL (ref 8.6–10.4)
CHLORIDE SERPL-SCNC: 104 MMOL/L (ref 98–107)
CHOLEST SERPL-MCNC: 181 MG/DL
CHOLESTEROL/HDL RATIO: 3
CO2 SERPL-SCNC: 27 MMOL/L (ref 20–31)
CREAT SERPL-MCNC: 0.4 MG/DL (ref 0.5–0.9)
EOSINOPHIL # BLD: 0.19 K/UL (ref 0–0.44)
EOSINOPHILS RELATIVE PERCENT: 2 % (ref 1–4)
ERYTHROCYTE [DISTWIDTH] IN BLOOD BY AUTOMATED COUNT: 14.1 % (ref 11.8–14.4)
GFR SERPL CREATININE-BSD FRML MDRD: >60 ML/MIN/1.73M2
GLUCOSE SERPL-MCNC: 99 MG/DL (ref 70–99)
HCT VFR BLD AUTO: 44.7 % (ref 36.3–47.1)
HDLC SERPL-MCNC: 60 MG/DL
HGB BLD-MCNC: 14.2 G/DL (ref 11.9–15.1)
IMM GRANULOCYTES # BLD AUTO: <0.03 K/UL (ref 0–0.3)
IMM GRANULOCYTES NFR BLD: 0 %
LDLC SERPL CALC-MCNC: 106 MG/DL (ref 0–130)
LYMPHOCYTES # BLD: 22 % (ref 24–43)
LYMPHOCYTES NFR BLD: 1.89 K/UL (ref 1.1–3.7)
MCH RBC QN AUTO: 28.9 PG (ref 25.2–33.5)
MCHC RBC AUTO-ENTMCNC: 31.8 G/DL (ref 28.4–34.8)
MCV RBC AUTO: 91 FL (ref 82.6–102.9)
MONOCYTES NFR BLD: 0.62 K/UL (ref 0.1–1.2)
MONOCYTES NFR BLD: 7 % (ref 3–12)
NEUTROPHILS NFR BLD: 68 % (ref 36–65)
NEUTS SEG NFR BLD: 5.95 K/UL (ref 1.5–8.1)
NRBC BLD-RTO: 0 PER 100 WBC
PLATELET # BLD AUTO: 295 K/UL (ref 138–453)
PMV BLD AUTO: 10.8 FL (ref 8.1–13.5)
POTASSIUM SERPL-SCNC: 4.3 MMOL/L (ref 3.7–5.3)
PROT SERPL-MCNC: 7.2 G/DL (ref 6.4–8.3)
RBC # BLD AUTO: 4.91 M/UL (ref 3.95–5.11)
SODIUM SERPL-SCNC: 140 MMOL/L (ref 135–144)
TRIGL SERPL-MCNC: 76 MG/DL
WBC OTHER # BLD: 8.8 K/UL (ref 3.5–11.3)

## 2023-07-15 PROCEDURE — 80061 LIPID PANEL: CPT

## 2023-07-15 PROCEDURE — 85027 COMPLETE CBC AUTOMATED: CPT

## 2023-07-15 PROCEDURE — 83036 HEMOGLOBIN GLYCOSYLATED A1C: CPT

## 2023-07-15 PROCEDURE — 36415 COLL VENOUS BLD VENIPUNCTURE: CPT

## 2023-07-15 PROCEDURE — 80053 COMPREHEN METABOLIC PANEL: CPT

## 2023-07-16 LAB
EST. AVERAGE GLUCOSE BLD GHB EST-MCNC: 108 MG/DL
HBA1C MFR BLD: 5.4 % (ref 4–6)

## 2024-01-15 PROBLEM — L08.9 LOCAL INFECTION OF SKIN AND SUBCUTANEOUS TISSUE: Status: RESOLVED | Noted: 2024-01-15 | Resolved: 2024-01-15

## 2024-01-15 PROBLEM — L02.211 ABDOMINAL WALL ABSCESS: Status: ACTIVE | Noted: 2024-01-15

## 2024-01-15 PROBLEM — L72.3 SEBACEOUS CYST: Status: ACTIVE | Noted: 2024-01-15

## 2024-02-29 ENCOUNTER — HOSPITAL ENCOUNTER (OUTPATIENT)
Dept: MAMMOGRAPHY | Age: 64
Discharge: HOME | End: 2024-03-02
Payer: COMMERCIAL

## 2024-02-29 DIAGNOSIS — Z12.31 SCREENING MAMMOGRAM FOR HIGH-RISK PATIENT: ICD-10-CM

## 2024-02-29 PROCEDURE — 77063 BREAST TOMOSYNTHESIS BI: CPT

## 2024-03-13 SDOH — HEALTH STABILITY: PHYSICAL HEALTH: ON AVERAGE, HOW MANY MINUTES DO YOU ENGAGE IN EXERCISE AT THIS LEVEL?: 10 MIN

## 2024-03-13 SDOH — HEALTH STABILITY: PHYSICAL HEALTH: ON AVERAGE, HOW MANY DAYS PER WEEK DO YOU ENGAGE IN MODERATE TO STRENUOUS EXERCISE (LIKE A BRISK WALK)?: 3 DAYS

## 2024-03-15 ENCOUNTER — OFFICE VISIT (OUTPATIENT)
Dept: ORTHOPEDIC SURGERY | Age: 64
End: 2024-03-15
Payer: COMMERCIAL

## 2024-03-15 VITALS — BODY MASS INDEX: 49.48 KG/M2 | WEIGHT: 252 LBS | HEIGHT: 60 IN | RESPIRATION RATE: 14 BRPM

## 2024-03-15 DIAGNOSIS — M25.562 CHRONIC PAIN OF BOTH KNEES: Primary | ICD-10-CM

## 2024-03-15 DIAGNOSIS — M25.561 CHRONIC PAIN OF BOTH KNEES: Primary | ICD-10-CM

## 2024-03-15 DIAGNOSIS — G89.29 CHRONIC PAIN OF BOTH KNEES: Primary | ICD-10-CM

## 2024-03-15 DIAGNOSIS — M17.0 BILATERAL PRIMARY OSTEOARTHRITIS OF KNEE: ICD-10-CM

## 2024-03-15 PROCEDURE — 99204 OFFICE O/P NEW MOD 45 MIN: CPT | Performed by: PHYSICIAN ASSISTANT

## 2024-03-15 NOTE — PROGRESS NOTES
[facility] Orthopedics & Sports Medicine                Milton Ordoñez PA-C            [address], [address]               [place], [place] [address]           Dept Phone: [phone]           Dept Fax:  [phone] [address] [facility]                       [address]           [place], [place] [address]          Dept Phone: [phone]           Dept Fax:  [phone]      Chief Compliant:  Chief Complaint   Patient presents with    Knee Pain     bilateral        History of Present Illness:  This is a 63 y.o. female who presents to the clinic today for evaluation of had concerns including Knee Pain (bilateral).     Ms. Foster is a 63-year-old female who presents for evaluation of chronic bilateral knee pain.  Patient believes she has had pain in his knees for 4+ years without preceding injury or trauma.  She states the onset of pain was gradual in nature and has progressively worsened since.  She has been taking meloxicam 15 mg daily for approximately 2 years per her PCP.  This medication continues to provide adequate relief of pain.    Patient denies any history of injections, physical therapy or surgery on either knee in the past.       Past History:    Current Outpatient Medications:     meloxicam (MOBIC) 15 MG tablet, TAKE 1 TABLET IN THE MORNING, Disp: 90 tablet, Rfl: 3    desvenlafaxine succinate (PRISTIQ) 50 MG TB24 extended release tablet, TAKE 1 TABLET DAILY, Disp: 90 tablet, Rfl: 3    hydroCHLOROthiazide (HYDRODIURIL) 25 MG tablet, TAKE 1 TABLET DAILY, Disp: 90 tablet, Rfl: 3    magnesium 30 MG tablet, Take 1 tablet by mouth 2 times daily, Disp: , Rfl:     Handicap Placard MISC, by Does not apply route 5 years, cannot walk over 200 ft., Disp: 1 each, Rfl: 0    Glucosamine-Chondroit-Vit C-Mn (GLUCOSAMINE CHONDR 1500 COMPLX PO), Take by mouth, Disp: , Rfl:     ferrous sulfate 325 (65 FE) MG tablet, Take 1 tablet by mouth daily (with breakfast), Disp: , Rfl:     Ascorbic Acid

## 2024-03-18 ENCOUNTER — OFFICE VISIT (OUTPATIENT)
Dept: ORTHOPEDIC SURGERY | Age: 64
End: 2024-03-18
Payer: COMMERCIAL

## 2024-03-18 VITALS — BODY MASS INDEX: 50.06 KG/M2 | HEIGHT: 60 IN | RESPIRATION RATE: 14 BRPM | WEIGHT: 255 LBS

## 2024-03-18 DIAGNOSIS — M17.0 BILATERAL PRIMARY OSTEOARTHRITIS OF KNEE: Primary | ICD-10-CM

## 2024-03-18 PROCEDURE — 90000 NO LOS: CPT | Performed by: PHYSICIAN ASSISTANT

## 2024-03-18 PROCEDURE — 20610 DRAIN/INJ JOINT/BURSA W/O US: CPT | Performed by: PHYSICIAN ASSISTANT

## 2024-03-18 RX ORDER — BUPIVACAINE HYDROCHLORIDE 5 MG/ML
2 INJECTION, SOLUTION PERINEURAL ONCE
Status: COMPLETED | OUTPATIENT
Start: 2024-03-18 | End: 2024-03-18

## 2024-03-18 RX ORDER — BETAMETHASONE SODIUM PHOSPHATE AND BETAMETHASONE ACETATE 3; 3 MG/ML; MG/ML
12 INJECTION, SUSPENSION INTRA-ARTICULAR; INTRALESIONAL; INTRAMUSCULAR; SOFT TISSUE ONCE
Status: COMPLETED | OUTPATIENT
Start: 2024-03-18 | End: 2024-03-18

## 2024-03-18 RX ORDER — LIDOCAINE HYDROCHLORIDE 10 MG/ML
2 INJECTION, SOLUTION INFILTRATION; PERINEURAL ONCE
Status: COMPLETED | OUTPATIENT
Start: 2024-03-18 | End: 2024-03-18

## 2024-03-18 RX ADMIN — BUPIVACAINE HYDROCHLORIDE 10 MG: 5 INJECTION, SOLUTION PERINEURAL at 09:17

## 2024-03-18 RX ADMIN — LIDOCAINE HYDROCHLORIDE 2 ML: 10 INJECTION, SOLUTION INFILTRATION; PERINEURAL at 09:18

## 2024-03-18 RX ADMIN — LIDOCAINE HYDROCHLORIDE 2 ML: 10 INJECTION, SOLUTION INFILTRATION; PERINEURAL at 09:19

## 2024-03-18 RX ADMIN — BUPIVACAINE HYDROCHLORIDE 10 MG: 5 INJECTION, SOLUTION PERINEURAL at 09:18

## 2024-03-18 RX ADMIN — BETAMETHASONE SODIUM PHOSPHATE AND BETAMETHASONE ACETATE 12 MG: 3; 3 INJECTION, SUSPENSION INTRA-ARTICULAR; INTRALESIONAL; INTRAMUSCULAR; SOFT TISSUE at 09:16

## 2024-03-18 RX ADMIN — BETAMETHASONE SODIUM PHOSPHATE AND BETAMETHASONE ACETATE 12 MG: 3; 3 INJECTION, SUSPENSION INTRA-ARTICULAR; INTRALESIONAL; INTRAMUSCULAR; SOFT TISSUE at 09:17

## 2024-03-18 NOTE — PROGRESS NOTES
[facility] Orthopedics & Sports Medicine                Milton Ordoñez PA-C            [address]               [place], [place] [address]           Dept Phone: [phone]           Dept Fax:  [phone] [address] [facility]                       [address]           [place], [place] [address]          Dept Phone: [phone]           Dept Fax:  [phone]    Chief Compliant:  Chief Complaint   Patient presents with    Knee Pain     Bilateral         History of Present Illness:  This is a 63 y.o. female who presents to the clinic today for bilateral corticosteroid injection.  Previous treatment includes over-the-counter and prescription NSAIDs.  No history of injections.  Patient initial eval by me on 3/15/2024 elected to proceed with injections but could not get them that day so he returns today for procedural appointment.  Patient denies any fever, chills, knee joint warmth, redness, numbness or tingling.    Past History:    Current Outpatient Medications:     meloxicam (MOBIC) 15 MG tablet, TAKE 1 TABLET IN THE MORNING, Disp: 90 tablet, Rfl: 3    desvenlafaxine succinate (PRISTIQ) 50 MG TB24 extended release tablet, TAKE 1 TABLET DAILY, Disp: 90 tablet, Rfl: 3    hydroCHLOROthiazide (HYDRODIURIL) 25 MG tablet, TAKE 1 TABLET DAILY, Disp: 90 tablet, Rfl: 3    magnesium 30 MG tablet, Take 1 tablet by mouth 2 times daily, Disp: , Rfl:     Handicap Placard MISC, by Does not apply route 5 years, cannot walk over 200 ft., Disp: 1 each, Rfl: 0    Glucosamine-Chondroit-Vit C-Mn (GLUCOSAMINE CHONDR 1500 COMPLX PO), Take by mouth, Disp: , Rfl:     ferrous sulfate 325 (65 FE) MG tablet, Take 1 tablet by mouth daily (with breakfast), Disp: , Rfl:     Ascorbic Acid (VITAMIN C) 250 MG tablet, Take 1 tablet by mouth daily, Disp: , Rfl:   Allergies   Allergen Reactions    Erythromycin Nausea Only     Stomach pain     Social History     Socioeconomic History    Marital status:

## 2024-07-19 ENCOUNTER — OFFICE VISIT (OUTPATIENT)
Dept: ORTHOPEDIC SURGERY | Age: 64
End: 2024-07-19
Payer: COMMERCIAL

## 2024-07-19 ENCOUNTER — HOSPITAL ENCOUNTER (OUTPATIENT)
Age: 64
Discharge: HOME | End: 2024-07-19
Payer: COMMERCIAL

## 2024-07-19 VITALS — HEIGHT: 60 IN | WEIGHT: 253 LBS | RESPIRATION RATE: 16 BRPM | BODY MASS INDEX: 49.67 KG/M2

## 2024-07-19 DIAGNOSIS — R73.03 PREDIABETES: ICD-10-CM

## 2024-07-19 DIAGNOSIS — I10 HYPERTENSION, BENIGN: Chronic | ICD-10-CM

## 2024-07-19 DIAGNOSIS — M17.0 BILATERAL PRIMARY OSTEOARTHRITIS OF KNEE: Primary | ICD-10-CM

## 2024-07-19 LAB
ALBUMIN SERPL-MCNC: 4.5 G/DL (ref 3.5–5.2)
ALBUMIN/GLOB SERPL: 1 {RATIO} (ref 1–2.5)
ALP SERPL-CCNC: 118 U/L (ref 35–104)
ALT SERPL-CCNC: 32 U/L (ref 10–35)
ANION GAP SERPL CALCULATED.3IONS-SCNC: 11 MMOL/L (ref 9–16)
AST SERPL-CCNC: 23 U/L (ref 10–35)
BASOPHILS # BLD: 0.1 K/UL (ref 0–0.2)
BASOPHILS NFR BLD: 1 % (ref 0–2)
BILIRUB SERPL-MCNC: 0.4 MG/DL (ref 0–1.2)
BUN SERPL-MCNC: 15 MG/DL (ref 8–23)
CALCIUM SERPL-MCNC: 11 MG/DL (ref 8.6–10.4)
CHLORIDE SERPL-SCNC: 104 MMOL/L (ref 98–107)
CHOLEST SERPL-MCNC: 205 MG/DL (ref 0–199)
CHOLESTEROL/HDL RATIO: 3
CO2 SERPL-SCNC: 25 MMOL/L (ref 20–31)
CREAT SERPL-MCNC: 0.6 MG/DL (ref 0.5–0.9)
EOSINOPHIL # BLD: 0.16 K/UL (ref 0–0.44)
EOSINOPHILS RELATIVE PERCENT: 2 % (ref 1–4)
ERYTHROCYTE [DISTWIDTH] IN BLOOD BY AUTOMATED COUNT: 14.2 % (ref 11.8–14.4)
EST. AVERAGE GLUCOSE BLD GHB EST-MCNC: 111 MG/DL
GFR, ESTIMATED: >90 ML/MIN/1.73M2
GLUCOSE SERPL-MCNC: 104 MG/DL (ref 74–99)
HBA1C MFR BLD: 5.5 % (ref 4–6)
HCT VFR BLD AUTO: 44.6 % (ref 36.3–47.1)
HDLC SERPL-MCNC: 63 MG/DL
HGB BLD-MCNC: 14.3 G/DL (ref 11.9–15.1)
IMM GRANULOCYTES # BLD AUTO: 0.03 K/UL (ref 0–0.3)
IMM GRANULOCYTES NFR BLD: 0 %
LDLC SERPL CALC-MCNC: 119 MG/DL (ref 0–100)
LYMPHOCYTES NFR BLD: 1.72 K/UL (ref 1.1–3.7)
LYMPHOCYTES RELATIVE PERCENT: 18 % (ref 24–43)
MCH RBC QN AUTO: 28.6 PG (ref 25.2–33.5)
MCHC RBC AUTO-ENTMCNC: 32.1 G/DL (ref 28.4–34.8)
MCV RBC AUTO: 89.2 FL (ref 82.6–102.9)
MONOCYTES NFR BLD: 0.68 K/UL (ref 0.1–1.2)
MONOCYTES NFR BLD: 7 % (ref 3–12)
NEUTROPHILS NFR BLD: 72 % (ref 36–65)
NEUTS SEG NFR BLD: 6.93 K/UL (ref 1.5–8.1)
NRBC BLD-RTO: 0 PER 100 WBC
PLATELET # BLD AUTO: 307 K/UL (ref 138–453)
PMV BLD AUTO: 11.1 FL (ref 8.1–13.5)
POTASSIUM SERPL-SCNC: 4.1 MMOL/L (ref 3.7–5.3)
PROT SERPL-MCNC: 7.7 G/DL (ref 6.6–8.7)
RBC # BLD AUTO: 5 M/UL (ref 3.95–5.11)
SODIUM SERPL-SCNC: 140 MMOL/L (ref 136–145)
TRIGL SERPL-MCNC: 116 MG/DL
VLDLC SERPL CALC-MCNC: 23 MG/DL
WBC OTHER # BLD: 9.6 K/UL (ref 3.5–11.3)

## 2024-07-19 PROCEDURE — 80061 LIPID PANEL: CPT

## 2024-07-19 PROCEDURE — 20610 DRAIN/INJ JOINT/BURSA W/O US: CPT | Performed by: PHYSICIAN ASSISTANT

## 2024-07-19 PROCEDURE — 36415 COLL VENOUS BLD VENIPUNCTURE: CPT

## 2024-07-19 PROCEDURE — 80053 COMPREHEN METABOLIC PANEL: CPT

## 2024-07-19 PROCEDURE — 99213 OFFICE O/P EST LOW 20 MIN: CPT | Performed by: PHYSICIAN ASSISTANT

## 2024-07-19 PROCEDURE — 85025 COMPLETE CBC W/AUTO DIFF WBC: CPT

## 2024-07-19 PROCEDURE — 83036 HEMOGLOBIN GLYCOSYLATED A1C: CPT

## 2024-07-19 RX ORDER — BETAMETHASONE SODIUM PHOSPHATE AND BETAMETHASONE ACETATE 3; 3 MG/ML; MG/ML
12 INJECTION, SUSPENSION INTRA-ARTICULAR; INTRALESIONAL; INTRAMUSCULAR; SOFT TISSUE ONCE
Status: COMPLETED | OUTPATIENT
Start: 2024-07-19 | End: 2024-07-19

## 2024-07-19 RX ORDER — LIDOCAINE HYDROCHLORIDE 10 MG/ML
2 INJECTION, SOLUTION INFILTRATION; PERINEURAL ONCE
Status: COMPLETED | OUTPATIENT
Start: 2024-07-19 | End: 2024-07-19

## 2024-07-19 RX ORDER — BUPIVACAINE HYDROCHLORIDE 5 MG/ML
2 INJECTION, SOLUTION PERINEURAL ONCE
Status: COMPLETED | OUTPATIENT
Start: 2024-07-19 | End: 2024-07-19

## 2024-07-19 RX ADMIN — LIDOCAINE HYDROCHLORIDE 2 ML: 10 INJECTION, SOLUTION INFILTRATION; PERINEURAL at 10:13

## 2024-07-19 RX ADMIN — LIDOCAINE HYDROCHLORIDE 2 ML: 10 INJECTION, SOLUTION INFILTRATION; PERINEURAL at 10:14

## 2024-07-19 RX ADMIN — BUPIVACAINE HYDROCHLORIDE 10 MG: 5 INJECTION, SOLUTION PERINEURAL at 10:12

## 2024-07-19 RX ADMIN — BETAMETHASONE SODIUM PHOSPHATE AND BETAMETHASONE ACETATE 12 MG: 3; 3 INJECTION, SUSPENSION INTRA-ARTICULAR; INTRALESIONAL; INTRAMUSCULAR; SOFT TISSUE at 10:10

## 2024-07-19 RX ADMIN — BETAMETHASONE SODIUM PHOSPHATE AND BETAMETHASONE ACETATE 12 MG: 3; 3 INJECTION, SUSPENSION INTRA-ARTICULAR; INTRALESIONAL; INTRAMUSCULAR; SOFT TISSUE at 10:11

## 2024-07-19 RX ADMIN — BUPIVACAINE HYDROCHLORIDE 10 MG: 5 INJECTION, SOLUTION PERINEURAL at 10:13

## 2024-07-22 NOTE — PROGRESS NOTES
elevated area tonight and return to normal activity as tolerated tomorrow. Patient is educated on appropriate length of time to allow for injections to start to take affect.   RTC in 4 months patient may call return sooner for any questions or concerns    Procedure Note: bilateral Celestone Injection of bilateral knee (s)  An informed verbal consent for the procedure was obtained and risks including, but not limited to: allergy to medications, injection, bleeding, stiffness of joint, recurrence of symptoms, loss of function, swelling, drainage, irrigation, need for surgery and pseudo-septic inflammation, were explained to the patient. Also, discussed was the potential for further injections, irrigation and debridement and surgery. Alternate means of treatment have also been discussed with the patient.      Following an appropriate discussion with the patient regarding the risks and benefits of the procedure she consented to proceed. her bilateral knees was prepped using betadine solution and alcohol swab. Using aseptic technique and through a lateral joint line approach, her bilateral knee (s) was injected superficially with 4 cc of 1% lidocaine without epinephrine and subsequently the 2 cc of 6mg/ml Celestone solution was injected intra-articularly to both knees.  A band aid was applied to the injection site. she tolerated the injection with no immediate adverse reactions.            Please note that this chart was generated using voice recognition Dragon dictation software.  Although every effort was made to ensure the accuracy of this automated transcription, some errors in transcription may have occurred.

## 2024-12-04 ENCOUNTER — OFFICE VISIT (OUTPATIENT)
Dept: ORTHOPEDIC SURGERY | Age: 64
End: 2024-12-04
Payer: COMMERCIAL

## 2024-12-04 VITALS — WEIGHT: 255 LBS | BODY MASS INDEX: 50.06 KG/M2 | HEIGHT: 60 IN

## 2024-12-04 DIAGNOSIS — M17.0 BILATERAL PRIMARY OSTEOARTHRITIS OF KNEE: Primary | ICD-10-CM

## 2024-12-04 PROCEDURE — 20610 DRAIN/INJ JOINT/BURSA W/O US: CPT | Performed by: PHYSICIAN ASSISTANT

## 2024-12-04 PROCEDURE — 99213 OFFICE O/P EST LOW 20 MIN: CPT | Performed by: PHYSICIAN ASSISTANT

## 2024-12-04 RX ORDER — LIDOCAINE HYDROCHLORIDE 10 MG/ML
2 INJECTION, SOLUTION INFILTRATION; PERINEURAL ONCE
Status: COMPLETED | OUTPATIENT
Start: 2024-12-04 | End: 2024-12-04

## 2024-12-04 RX ORDER — BUPIVACAINE HYDROCHLORIDE 5 MG/ML
2 INJECTION, SOLUTION PERINEURAL ONCE
Status: COMPLETED | OUTPATIENT
Start: 2024-12-04 | End: 2024-12-04

## 2024-12-04 RX ORDER — BETAMETHASONE SODIUM PHOSPHATE AND BETAMETHASONE ACETATE 3; 3 MG/ML; MG/ML
12 INJECTION, SUSPENSION INTRA-ARTICULAR; INTRALESIONAL; INTRAMUSCULAR; SOFT TISSUE ONCE
Status: COMPLETED | OUTPATIENT
Start: 2024-12-04 | End: 2024-12-04

## 2024-12-04 RX ADMIN — BUPIVACAINE HYDROCHLORIDE 10 MG: 5 INJECTION, SOLUTION PERINEURAL at 11:55

## 2024-12-04 RX ADMIN — LIDOCAINE HYDROCHLORIDE 2 ML: 10 INJECTION, SOLUTION INFILTRATION; PERINEURAL at 11:55

## 2024-12-04 RX ADMIN — BETAMETHASONE SODIUM PHOSPHATE AND BETAMETHASONE ACETATE 12 MG: 3; 3 INJECTION, SUSPENSION INTRA-ARTICULAR; INTRALESIONAL; INTRAMUSCULAR; SOFT TISSUE at 11:53

## 2024-12-04 RX ADMIN — BUPIVACAINE HYDROCHLORIDE 10 MG: 5 INJECTION, SOLUTION PERINEURAL at 11:54

## 2024-12-04 RX ADMIN — BETAMETHASONE SODIUM PHOSPHATE AND BETAMETHASONE ACETATE 12 MG: 3; 3 INJECTION, SUSPENSION INTRA-ARTICULAR; INTRALESIONAL; INTRAMUSCULAR; SOFT TISSUE at 11:54

## 2024-12-04 NOTE — PROGRESS NOTES
[facility] Orthopedics & Sports Medicine                Milton Ordoñez PA-C            [address]               [place], [place] [address]           Dept Phone: [phone]           Dept Fax:  [phone] [address] [facility]                       [address]           [place], [place] [address]          Dept Phone: [phone]           Dept Fax:  [phone]    Chief Compliant:  Chief Complaint   Patient presents with    Knee Pain     Bilateral knee pain        History of Present Illness:  This is a 64 y.o. female who presents to the clinic today for bilateral corticosteroid injection.  Previous treatment includes over-the-counter and prescription NSAIDs.  No history of injections.  Patient last seen on 7/19/2024 with good improvement in her pain x4 months      Patient returns today hoping undergo repeat injections of both knees.    Past History:    Current Outpatient Medications:     meloxicam (MOBIC) 15 MG tablet, TAKE 1 TABLET IN THE MORNING, Disp: 90 tablet, Rfl: 3    desvenlafaxine succinate (PRISTIQ) 50 MG TB24 extended release tablet, TAKE 1 TABLET DAILY, Disp: 90 tablet, Rfl: 3    hydroCHLOROthiazide (HYDRODIURIL) 25 MG tablet, TAKE 1 TABLET DAILY, Disp: 90 tablet, Rfl: 3    Handicap Placard MISC, by Does not apply route 5 years, cannot walk over 200 ft., Disp: 1 each, Rfl: 0    ferrous sulfate 325 (65 FE) MG tablet, Take 1 tablet by mouth daily (with breakfast), Disp: , Rfl:     Ascorbic Acid (VITAMIN C) 250 MG tablet, Take 1 tablet by mouth daily, Disp: , Rfl:   Allergies   Allergen Reactions    Erythromycin Nausea Only     Stomach pain     Social History     Socioeconomic History    Marital status:      Spouse name: Not on file    Number of children: Not on file    Years of education: Not on file    Highest education level: Not on file   Occupational History    Not on file   Tobacco Use    Smoking status: Former     Current packs/day: 0.00     Average packs/day:

## 2024-12-12 ENCOUNTER — HOSPITAL ENCOUNTER (OUTPATIENT)
Dept: PHYSICAL THERAPY | Facility: CLINIC | Age: 64
Setting detail: THERAPIES SERIES
Discharge: HOME | End: 2024-12-12
Payer: COMMERCIAL

## 2024-12-12 PROCEDURE — 97161 PT EVAL LOW COMPLEX 20 MIN: CPT

## 2024-12-12 PROCEDURE — 97110 THERAPEUTIC EXERCISES: CPT

## 2024-12-12 NOTE — CONSULTS
10554  [] Iontophoresis: 4 mg/mL Dexamethasone Sodium Phosphate  mAmin  69410   [x] Therapeutic Activity  04510 [x] Vasopneumatic cold with compression  91945    [x] Gait Training   73247 [] Ultrasound   50748   [] Neuromuscular Re-education  77186 [] Electrical Stimulation Unattended  43361   [x] Manual Therapy  63150 [] Electrical Stimulation Attended  36501   [x] Instruction in HEP  [] Lumbar/Cervical Traction  82916   [x] Aquatic Therapy   44619 [x] Cold/hotpack    [] Massage   72500      [] Dry Needling, 1 or 2 muscles  20560   [] Biofeedback, first 15 minutes   90912  [] Biofeedback, additional 15 minutes   90913 [] Dry Needling, 3 or more muscles  20561     [x]  Medication allergies reviewed for use of    Dexamethasone Sodium Phosphate 4mg/ml     with iontophoresis treatments.   Pt is not allergic.    Frequency:  1-2 x/week for 20 visits        Today’s Treatment:  Modalities: Add vaso for pain management PRN  Precautions:  Exercises:  Exercise Performed Reps/ Time Weight/ Level Comments   MAT       Seated march x X20 ea     LAQ x 2x10 ea  5\" hold   SLR x 2x10 ea  3\" eccentric   Clamshell x 2x10 ea            Gym       STS x 2x10  No arms for first 10   Other:    Specific Instructions for next treatment: Progressive LE AROM and strengthening per pt tolerance with focus on improving quad; vaso PRN for pain modulation      Evaluation Complexity:  History (Personal factors, comorbidities) [] 0 [x] 1-2 [] 3+   Exam (limitations, restrictions) [x] 1-2 [] 3 [] 4+   Clinical presentation (progression) [x] Stable [] Evolving  [] Unstable   Decision Making [x] Low [] Moderate [] High    [x] Low Complexity [] Moderate Complexity [] High Complexity       Treatment Charges: Mins Units Time in/ out   [x] Evaluation       [x]  Low       []  Moderate       []  High 25 1 6:00-6:25 PM   []  Modalities      [x]  Ther Exercise 25 2 6:25-6:50 PM   []  Manual Therapy      []  Ther Activities      []  Aquatics      []

## 2024-12-23 ENCOUNTER — HOSPITAL ENCOUNTER (OUTPATIENT)
Dept: PHYSICAL THERAPY | Facility: CLINIC | Age: 64
Setting detail: THERAPIES SERIES
Discharge: HOME | End: 2024-12-23
Payer: COMMERCIAL

## 2024-12-23 PROCEDURE — 97110 THERAPEUTIC EXERCISES: CPT

## 2024-12-23 NOTE — FLOWSHEET NOTE
[] [facility]  Outpatient Rehabilitation &  Therapy  [address]  [phone]  [phone] [] [facility]  Outpatient Rehabilitation &  Therapy  [address]  P: [phone]  F: [phone] [x] [facility]  Outpatient Rehabilitation &  Therapy  [address]  P: [phone]  F: [phone] [] [facility]  Outpatient Rehabilitation &  Therapy  [address]  [phone]  [phone] [] [facility]  Outpatient Rehabilitation &  Therapy  [address]   P: [phone]  F: [phone]  [] [facility]  Outpatient Rehabilitation &  Therapy  [address]  P: [phone]  F: [phone] [] [facility]  Outpatient Rehabilitation &  Therapy  [address]  P: [phone]  F: [phone] [] [facility]  Outpatient Rehabilitation &  Therapy  [address]  P: [phone]  F: [phone] [] [facility]  Outpatient Rehabilitation &  Therapy  [address]  P: [phone]  F: [phone]  [] [facility] Outpatient Rehabilitation &  Therapy  [address]  P: [phone]  F: [phone]     Physical Therapy Daily Treatment Note    Date:  2024  Patient Name:  Radha Foster    :  1960  MRN: [id number]  Physician:     Milton Ordoñez PA   Insurance:  [employer]: alexis  120  vs remain, hard max, no combined; no auth req'd; no copay; ded met; coins 20%; oop 4350 / 2853 remain   Medical Diagnosis: M17.0 (ICD-10-CM) - Bilateral primary osteoarthritis of knee Rehab Codes: M17.0 (ICD-10-CM) - Bilateral primary osteoarthritis of knee   Onset date: 2024                       Next 's appt.: Pending       Visit# / total visits:     Cancels/No Shows:     Subjective:    Pain:  [x] Yes  [] No Location: B Knees

## 2024-12-26 ENCOUNTER — HOSPITAL ENCOUNTER (OUTPATIENT)
Dept: PHYSICAL THERAPY | Facility: CLINIC | Age: 64
Setting detail: THERAPIES SERIES
Discharge: HOME | End: 2024-12-26
Payer: COMMERCIAL

## 2024-12-26 PROCEDURE — 97110 THERAPEUTIC EXERCISES: CPT

## 2024-12-26 NOTE — FLOWSHEET NOTE
[x] [facility]  Outpatient Rehabilitation &  Therapy  [address]  P: [phone]  F: [phone]     Physical Therapy Daily Treatment Note    Date:  2024  Patient Name:  Radha Foster    :  1960  MRN: [id number]  Physician:     Milton Ordoñez PA   Insurance:  [employer]: alexis yr - 120 /120 vs remain, hard max, no combined; no auth req'd; no copay; ded met; coins 20%; oop 4350 / 2853 remain   Medical Diagnosis: M17.0 (ICD-10-CM) - Bilateral primary osteoarthritis of knee Rehab Codes: M17.0 (ICD-10-CM) - Bilateral primary osteoarthritis of knee   Onset date: 2024                       Next 's appt.: Pending     Visit# / total visits: 3/ 20    Cancels/No Shows:     Subjective:  Pt reports that she was sick with the flu the other day, but that she is feeling better now  Pain:  [x] Yes  [] No Location: B Knees  Pain Rating: (0-10 scale) 4/10  Pain altered Tx:  [] No  [] Yes  Action:  Comments:   Objective:    Today’s Treatment:  Modalities: Add vaso for pain management PRN  Precautions:  Exercises:  Exercise Performed Reps/ Time Weight/ Level Comments   Nu Step x 8' L3    Standing HS S x 3x20\"     SB S x 3x30\"     MAT          Seated Heel Slides   10x10\"     Seated march  X20 ea       SAQ x 2x10 ea 3# 5# hold   LAQ x 2x10 ea 3# 5\" hold   SLR x 2x10 ea   3\" eccentric   Clamshell x 2x10 ea       SL hip abduction  x  2x10 ea               Gym           STS x 2x10   No arms for first 10   Standing HR  10x ea     Step Ups  x 10x ea 6\" Fwd and lateral   TG Squats  x x30 L17    Other:     Specific Instructions for next treatment: Progressive LE AROM and strengthening per pt tolerance with focus on improving quad; vaso PRN for pain modulation.  Add 3-way kick next       Treatment Charges: Mins Units Time In/Out   []  Modalities        [x]  Ther Exercise 55 4 3:55-4:50 PM   []  Neuromuscular Re-ed      []  Gait Training      []  Manual Therapy      []  Ther Activities

## 2024-12-31 ENCOUNTER — HOSPITAL ENCOUNTER (OUTPATIENT)
Dept: PHYSICAL THERAPY | Facility: CLINIC | Age: 64
Setting detail: THERAPIES SERIES
Discharge: HOME | End: 2024-12-31
Payer: COMMERCIAL

## 2024-12-31 PROCEDURE — 97110 THERAPEUTIC EXERCISES: CPT

## 2024-12-31 NOTE — FLOWSHEET NOTE
[x] [facility]  Outpatient Rehabilitation &  Therapy  [address]  P: [phone]  F: [phone]     Physical Therapy Daily Treatment Note    Date:  2024  Patient Name:  Radha Foster    :  1960  MRN: [id number]  Physician:     Milton Ordoñez PA   Insurance:  [employer]: alexis yr - 120 /120 vs remain, hard max, no combined; no auth req'd; no copay; ded met; coins 20%; oop 4350 / 2853 remain   Medical Diagnosis: M17.0 (ICD-10-CM) - Bilateral primary osteoarthritis of knee Rehab Codes: M17.0 (ICD-10-CM) - Bilateral primary osteoarthritis of knee   Onset date: 2024                       Next 's appt.: Pending     Visit# / total visits:     Cancels/No Shows: 0/0    Subjective:  Pt reports that she was very sore after the last session, but notes that she recovered quickly.  Good tolerance to current exercise program.   Pain:  [x] Yes  [] No Location: B Knees  Pain Rating: (0-10 scale) 4/10  Pain altered Tx:  [] No  [] Yes  Action:  Comments:     Objective:    Today’s Treatment:  Modalities: Add vaso for pain management PRN  Precautions:  Exercises:  Exercise Performed Reps/ Time Weight/ Level Comments   Nu Step x 8' L3    Standing HS S x 3x20\"     SB S x 3x30\"     MAT          Seated Heel Slides   10x10\"     Seated march  X20 ea       SAQ x 2x10 ea 3# 5\" hold   LAQ x 2x10 ea 3# 5\" hold   SLR x 2x10 ea   3\" eccentric   Clamshell x 2x10 ea       SL hip abduction  x  2x10 ea        Bridge x 15x3\"            Gym           STS  2x10   No arms for first 10   Standing HR  10x ea     Step Ups  x 10x ea 6\" Fwd and lateral   TG Squats  x x30 L20    3-way hip x X15 ea     Other:     Specific Instructions for next treatment: Progressive LE AROM and strengthening per pt tolerance with focus on improving quad; vaso PRN for pain modulation.  Add 3-way kick next       Treatment Charges: Mins Units Time In/Out   []  Modalities        [x]  Ther Exercise 55 4 10:00-10:55 AM   []

## 2025-01-02 ENCOUNTER — HOSPITAL ENCOUNTER (OUTPATIENT)
Dept: PHYSICAL THERAPY | Facility: CLINIC | Age: 65
Setting detail: THERAPIES SERIES
Discharge: HOME | End: 2025-01-02
Payer: COMMERCIAL

## 2025-01-02 PROCEDURE — 97110 THERAPEUTIC EXERCISES: CPT

## 2025-01-02 PROCEDURE — 97016 VASOPNEUMATIC DEVICE THERAPY: CPT

## 2025-01-02 NOTE — FLOWSHEET NOTE
ambulation without gross gait or balance deficits  Pt will navigate a full flight of steps in a step over step pattern in order to demonstrate improved strength and mobility to navigate community environments       Pt. Education:  [x] Yes  [] No  [x] Reviewed Prior HEP/Ed  Method of Education: [x] Verbal  [x] Demo  [x] Written  Comprehension of Education:  [x] Verbalizes understanding.  [] Demonstrates understanding.  [] Needs review.  [] Demonstrates/verbalizes HEP/Ed previously given.       Access Code: [email or web address]  URL: [email or web address]  Date: 12/12/2024  Prepared by: Marek Fink     Exercises  - Seated March  - 1 x daily - 7 x weekly - 3 sets - 10 reps - 2 hold  - Seated Long Arc Quad  - 1 x daily - 7 x weekly - 3 sets - 10 reps - 5 hold  - Supine Active Straight Leg Raise  - 1 x daily - 7 x weekly - 2 sets - 10 reps - 3 hold  - Sit to Stand with Armchair  - 1 x daily - 7 x weekly - 2 sets - 10 reps  - Clamshell  - 1 x daily - 7 x weekly - 2 sets - 10 reps     Patient Education  - Knee Osteoarthritis    Plan: [x] Continue current frequency toward long and short term goals.    [x] Specific Instructions for subsequent treatments: See above       Time In: 4:03 pm          Time Out: 5:15 pm    Electronically signed by:  Nataly Carlson PTA

## 2025-01-07 ENCOUNTER — HOSPITAL ENCOUNTER (OUTPATIENT)
Dept: PHYSICAL THERAPY | Facility: CLINIC | Age: 65
Setting detail: THERAPIES SERIES
Discharge: HOME | End: 2025-01-07
Payer: COMMERCIAL

## 2025-01-07 PROCEDURE — 97110 THERAPEUTIC EXERCISES: CPT

## 2025-01-07 PROCEDURE — 97016 VASOPNEUMATIC DEVICE THERAPY: CPT

## 2025-01-07 NOTE — FLOWSHEET NOTE
[x] [facility]  Outpatient Rehabilitation &  Therapy  [address]  P: [phone]  F: [phone]     Physical Therapy Daily Treatment Note    Date:  2025  Patient Name:  Radha Foster    :  1960  MRN: [id number]  Physician:     Milton Ordoñez PA   Insurance:  [employer]: alexis yr - 120 /120 vs remain, hard max, no combined; no auth req'd; no copay; ded met; coins 20%; oop 4350 / 2853 remain   Medical Diagnosis: M17.0 (ICD-10-CM) - Bilateral primary osteoarthritis of knee Rehab Codes: M17.0 (ICD-10-CM) - Bilateral primary osteoarthritis of knee   Onset date: 2024                       Next 's appt.: Pending     Visit# / total visits:     Cancels/No Shows: 0/0    Subjective:  Pt reports she has been active at work, and is feeling sore and fatigued coming in.   Pain:  [x] Yes  [] No Location: B Knees  Pain Rating: (0-10 scale) 4/10  Pain altered Tx:  [] No  [] Yes  Action:  Comments:     Objective:    Today’s Treatment:  Modalities: Add vaso for pain management PRN  Precautions:  Exercises:  Exercise Performed Reps/ Time Weight/ Level Comments   Nu Step x 8' L4    Standing HS S x 3x30\"     SB S x 3x30\"            MAT          Seated Heel Slides  x 10x10\"     Seated march  x20 ea       Seated toe raise  X20 ea     LAQ x 2x10 ea 4# 5\" hold          SAQ  2x10 ea 4# 5\" hold   SLR x 2x10 ea   3\" eccentric   Clamshell  2x10 ea       SL hip abduction    2x10 ea        Bridge  15x3\"     Supine hip adduction  2x10            Gym         STS x 2x10     Standing HR x 2x10      HS curl  x 2x10      Step taps  x 2x10 ea 8\" Fwd/lateral; start with R leg   Step ups x 10 ea 8\"    3-way hip x 10 ea     Other:     Specific Instructions for next treatment: Progressive LE AROM and strengthening per pt tolerance with focus on improving quad; vaso PRN for pain modulation.         Treatment Charges: Mins Units Time In/Out   []  Modalities        [x]  Ther Exercise 35 2    []

## 2025-01-09 ENCOUNTER — HOSPITAL ENCOUNTER (OUTPATIENT)
Dept: PHYSICAL THERAPY | Facility: CLINIC | Age: 65
Setting detail: THERAPIES SERIES
Discharge: HOME | End: 2025-01-09
Payer: COMMERCIAL

## 2025-01-09 PROCEDURE — 97016 VASOPNEUMATIC DEVICE THERAPY: CPT

## 2025-01-09 PROCEDURE — 97110 THERAPEUTIC EXERCISES: CPT

## 2025-01-09 NOTE — FLOWSHEET NOTE
[x] [facility]  Outpatient Rehabilitation &  Therapy  [address]  P: [phone]  F: [phone]     Physical Therapy Daily Treatment Note    Date:  2025  Patient Name:  Radha Foster    :  1960  MRN: [id number]  Physician:     Milton Ordoñez PA   Insurance:  [employer]: alexis yr - 120 /120 vs remain, hard max, no combined; no auth req'd; no copay; ded met; coins 20%; oop 4350 / 2853 remain   Medical Diagnosis: M17.0 (ICD-10-CM) - Bilateral primary osteoarthritis of knee Rehab Codes: M17.0 (ICD-10-CM) - Bilateral primary osteoarthritis of knee   Onset date: 2024                       Next 's appt.: Pending     Visit# / total visits:     Cancels/No Shows: 0/0    Subjective:  Pt reports some sciatic pain with working today.   Pain:  [x] Yes  [] No Location: B Knees  Pain Rating: (0-10 scale) 4/10  Pain altered Tx:  [] No  [] Yes  Action:  Comments:     Objective:    Today’s Treatment:  Modalities: Add vaso for pain management PRN  Precautions:  Exercises:  Exercise Performed Reps/ Time Weight/ Level Comments   Nu Step x 10' L4    Standing HS S x 3x30\"     SB S x 3x30\"            MAT          Seated Heel Slides  x 10x10\"     LAQ x 2x10 ea 4# 2\" hold   Piriformis S x 2x30\"            SLR x 2x10 ea 4# 3\" eccentric   Clamshell  2x10 ea       SL hip abduction    2x10 ea        Bridge  15x3\"            Gym         STS x 2x10     Standing HR/TR x 2x10      HS curl/march  x 2x10  4#    Step taps  x 10 ea 8\"    Step ups x 2x10 ea 8\" Fwd/lateral; start with R leg   3-way hip x 15 ea blue    Lateral stepping laps x 2  //   Other:     Specific Instructions for next treatment: Progressive LE AROM and strengthening per pt tolerance with focus on improving quad; vaso PRN for pain modulation.         Treatment Charges: Mins Units Time In/Out   []  Modalities        [x]  Ther Exercise 35 2    []  Neuromuscular Re-ed      []  Gait Training      []  Manual Therapy      []  Ther

## 2025-01-14 ENCOUNTER — HOSPITAL ENCOUNTER (OUTPATIENT)
Dept: PHYSICAL THERAPY | Facility: CLINIC | Age: 65
Setting detail: THERAPIES SERIES
Discharge: HOME | End: 2025-01-14
Payer: COMMERCIAL

## 2025-01-14 NOTE — FLOWSHEET NOTE
[] [facility]  Outpatient Rehabilitation &  Therapy  [address]  [phone]  [phone] [] [facility]  Outpatient Rehabilitation &  Therapy  [address]  P: [phone]  F: [phone] [] [facility]  Outpatient Rehabilitation &  Therapy  [address]  P: [phone]  F: [phone] [] [facility]  Outpatient Rehabilitation &  Therapy  [address]  [phone]  [phone] [] [facility]  Outpatient Rehabilitation &  Therapy  [address]   P: [phone]  F: [phone]  [] [facility]  Outpatient Rehabilitation &  Therapy  [address]  P: [phone]  F: [phone] [] [facility]  Outpatient Rehabilitation &  Therapy  [address]  P: [phone]  F: [phone] [] [facility]  Outpatient Rehabilitation &  Therapy  [address]  P: [phone]  F: [phone] [] [facility]  Outpatient Rehabilitation &  Therapy  [address]  P: [phone]  F: [phone]  [] [facility] Outpatient Rehabilitation &  Therapy  [address]  P: [phone]  F: [phone]     Therapy Cancel/No Show note    Date: 2025  Patient: Radha SUAREZ Foster  : 1960  MRN: [id number]    Cancels/No Shows to date:     For today's appointment patient:    [x]  Cancelled    [] Rescheduled appointment    [] No-show     Reason given by patient:    []  Patient ill    []  Conflicting appointment    [] No transportation      [] Conflict with work    [] No reason given    [] Weather related    [] COVID-19    [x] Other:      Comments:  dizziness      [] Next appointment was confirmed    Electronically signed by: Nataly Carlson PTA

## 2025-01-17 ENCOUNTER — HOSPITAL ENCOUNTER (OUTPATIENT)
Dept: PHYSICAL THERAPY | Facility: CLINIC | Age: 65
Setting detail: THERAPIES SERIES
Discharge: HOME | End: 2025-01-17
Payer: COMMERCIAL

## 2025-01-17 PROCEDURE — 97110 THERAPEUTIC EXERCISES: CPT

## 2025-01-17 PROCEDURE — 97016 VASOPNEUMATIC DEVICE THERAPY: CPT

## 2025-01-17 NOTE — FLOWSHEET NOTE
[x] [facility]  Outpatient Rehabilitation &  Therapy  [address]  P: [phone]  F: [phone]     Physical Therapy Daily Treatment Note    Date:  2025  Patient Name:  Radha Foster    :  1960  MRN: [id number]  Physician:     Milton Ordoñez PA   Insurance:  [employer]: alexis yr - 120 /120 vs remain, hard max, no combined; no auth req'd; no copay; ded met; coins 20%; oop 4350 / 2853 remain   Medical Diagnosis: M17.0 (ICD-10-CM) - Bilateral primary osteoarthritis of knee Rehab Codes: M17.0 (ICD-10-CM) - Bilateral primary osteoarthritis of knee   Onset date: 2024                       Next 's appt.: Pending     Visit# / total visits:     Cancels/No Shows: 0/0    Subjective:  Pt reports some cont sciatic pain but stretching resolved.   Pain:  [x] Yes  [] No Location: B Knees  Pain Rating: (0-10 scale) 4/10  Pain altered Tx:  [] No  [] Yes  Action:  Comments:     Objective:    Today’s Treatment:  Modalities: Add vaso for pain management PRN  Precautions:  Exercises:  Exercise Performed Reps/ Time Weight/ Level Comments   Nu Step x 8' L4    Standing HS S x 3x30\"     SB S x 3x30\"            MAT          Seated Heel Slides   10x10\"     LAQ x 3x10 ea 4# 2\" hold   Piriformis S  2x30\"            SLR  2x10 ea 4# 3\" eccentric   Clamshell  2x10 ea       SL hip abduction    2x10 ea        Bridge  15x3\"            Gym         STS x 2x15     Standing HR/TR x 2x15      HS curl/march  x 2x15  4#    Step taps  x 10 ea 8\"    Step ups x 2x15 ea 8\" Fwd/lateral; start with R leg   3-way hip x 2x15 ea blue    Lateral stepping laps x 2 blue //   Other:     Specific Instructions for next treatment: Progressive LE AROM and strengthening per pt tolerance with focus on improving quad; vaso PRN for pain modulation.         Treatment Charges: Mins Units Time In/Out   []  Modalities        [x]  Ther Exercise 35 2    []  Neuromuscular Re-ed      []  Gait Training      []  Manual Therapy

## 2025-01-21 ENCOUNTER — HOSPITAL ENCOUNTER (OUTPATIENT)
Dept: PHYSICAL THERAPY | Facility: CLINIC | Age: 65
Setting detail: THERAPIES SERIES
Discharge: HOME | End: 2025-01-21
Payer: COMMERCIAL

## 2025-01-21 PROCEDURE — 97110 THERAPEUTIC EXERCISES: CPT

## 2025-01-21 PROCEDURE — 97016 VASOPNEUMATIC DEVICE THERAPY: CPT

## 2025-01-21 NOTE — FLOWSHEET NOTE
[x] [facility]  Outpatient Rehabilitation &  Therapy  [address]  P: [phone]  F: [phone]     Physical Therapy Daily Treatment Note    Date:  2025  Patient Name:  Radha Foster    :  1960  MRN: [id number]  Physician:     Milton Ordoñez PA   Insurance:  [employer]: alexis yr - 120 /120 vs remain, hard max, no combined; no auth req'd; no copay; ded met; coins 20%; oop 4350 / 2853 remain   Medical Diagnosis: M17.0 (ICD-10-CM) - Bilateral primary osteoarthritis of knee Rehab Codes: M17.0 (ICD-10-CM) - Bilateral primary osteoarthritis of knee   Onset date: 2024                       Next 's appt.: Pending     Visit# / total visits:     Cancels/No Shows: 0/0    Subjective:  Pt mentioned no pain today just more of stiffness.    Pain:  [x] Yes  [] No Location: B Knees  Pain Rating: (0-10 scale) 4/10  Pain altered Tx:  [] No  [] Yes  Action:  Comments:     Objective:    Today’s Treatment:  Modalities: Add vaso for pain management PRN  Precautions:  Exercises:  Exercise Performed Reps/ Time Weight/ Level Comments   Nu Step xx 10' L4    Standing HS S xx 3x30\"     SB S xx 3x30\"            MAT          Seated Heel Slides   10x10\"     LAQ x 3x10 ea 4# 2\" hold   Piriformis S  2x30\"            SLR  2x10 ea 4# 3\" eccentric   Clamshell  2x10 ea       SL hip abduction    2x10 ea        Bridge  15x3\"            Gym         STS x 2x15     Standing HR/TR xx 2x15      HS curl/march  xx 2x15  4#    Step taps  xx 10 ea 8\"    Step ups xx 2x15 ea 8\" Fwd/lateral; start with R leg   3-way hip xx 2x15 ea blue    Lateral stepping laps x 2 blue //   Other:     Specific Instructions for next treatment: Progressive LE AROM and strengthening per pt tolerance with focus on improving quad; vaso PRN for pain modulation.         Treatment Charges: Mins Units Time In/Out   []  Modalities        [x]  Ther Exercise 40 3    []  Neuromuscular Re-ed      []  Gait Training      []  Manual Therapy

## 2025-01-23 ENCOUNTER — HOSPITAL ENCOUNTER (OUTPATIENT)
Dept: PHYSICAL THERAPY | Facility: CLINIC | Age: 65
Setting detail: THERAPIES SERIES
Discharge: HOME | End: 2025-01-23
Payer: COMMERCIAL

## 2025-01-23 ENCOUNTER — APPOINTMENT (OUTPATIENT)
Dept: PHYSICAL THERAPY | Facility: CLINIC | Age: 65
End: 2025-01-23
Payer: COMMERCIAL

## 2025-01-23 PROCEDURE — 97110 THERAPEUTIC EXERCISES: CPT

## 2025-01-23 PROCEDURE — 97016 VASOPNEUMATIC DEVICE THERAPY: CPT

## 2025-01-23 NOTE — FLOWSHEET NOTE
[x] [facility]  Outpatient Rehabilitation &  Therapy  [address]  P: [phone]  F: [phone]     Physical Therapy Daily Treatment Note    Date:  2025  Patient Name:  Radha Foster    :  1960  MRN: [id number]  Physician:     Milton Ordoñez PA   Insurance:  [employer]: alexis yr - 120 /120 vs remain, hard max, no combined; no auth req'd; no copay; ded met; coins 20%; oop 4350 / 2853 remain   Medical Diagnosis: M17.0 (ICD-10-CM) - Bilateral primary osteoarthritis of knee Rehab Codes: M17.0 (ICD-10-CM) - Bilateral primary osteoarthritis of knee   Onset date: 2024                       Next 's appt.: Pending     Visit# / total visits: 10/20    Cancels/No Shows: 0/0    Subjective:   Pain:  [x] Yes  [] No Location: B Knees  Pain Rating: (0-10 scale) 3.5/10  Pain altered Tx:  [] No  [] Yes  Action:  Comments: Pt arrives stating her legs are a little more stiff and fatigued today from arriving after work. Tolerating therapy well so far.     Objective:    Today’s Treatment:  Modalities: Vasocompression (B) Knees Med Pressure 10' in supine   Precautions:  Exercises:  Exercise Performed Reps/ Time Weight/ Level Comments   Nu Step x 10' L3    Standing HS S x 3x30\"     SB S x 3x30\"            MAT          Seated Heel Slides   10x10\"     LAQ x 3x10 ea 4# 2\" hold   Piriformis S  2x30\"            SLR  2x10 ea 4# 3\" eccentric   Clamshell  2x10 ea       SL hip abduction    2x10 ea        Bridge  15x3\"            Gym         STS x 2x15     Standing HR/TR x 2x15      HS curl/march  x 2x15  4#    Step taps  x 10 ea 8\"    Step ups x 2x15 ea 8\" Fwd/lateral; start with R leg-Reduced to 6\" 8 reps each. Held lateral per patient   3-way hip x 2x15 ea blue    Lateral stepping laps x 3L blue //   TKE  x 15x5\" blue    Other:     Specific Instructions for next treatment: Progressive LE AROM and strengthening per pt tolerance with focus on improving quad; vaso PRN for pain modulation.

## 2025-01-24 PROBLEM — F32.0 MILD MAJOR DEPRESSION (HCC): Status: ACTIVE | Noted: 2025-01-24

## 2025-01-24 PROBLEM — R73.03 PREDIABETES: Status: ACTIVE | Noted: 2025-01-24

## 2025-01-27 ENCOUNTER — HOSPITAL ENCOUNTER (OUTPATIENT)
Dept: PHYSICAL THERAPY | Facility: CLINIC | Age: 65
Setting detail: THERAPIES SERIES
Discharge: HOME | End: 2025-01-27
Payer: COMMERCIAL

## 2025-01-27 PROCEDURE — 97110 THERAPEUTIC EXERCISES: CPT

## 2025-01-27 NOTE — FLOWSHEET NOTE
[x] [facility]  Outpatient Rehabilitation &  Therapy  [address]  P: [phone]  F: [phone]     Physical Therapy Daily Treatment Note    Date:  2025  Patient Name:  Radha Foster    :  1960  MRN: [id number]  Physician:     Milton Ordoñez PA   Insurance:  [employer]: alexis yr - 120 /120 vs remain, hard max, no combined; no auth req'd; no copay; ded met; coins 20%; oop 4350 / 2853 remain   Medical Diagnosis: M17.0 (ICD-10-CM) - Bilateral primary osteoarthritis of knee Rehab Codes: M17.0 (ICD-10-CM) - Bilateral primary osteoarthritis of knee   Onset date: 2024                       Next 's appt.: Pending     Visit# / total visits:     Cancels/No Shows: 0/0    Subjective:   Pain:  [] Yes  [x] No Location: B Knees  Pain Rating: (0-10 scale) 0/10  Pain altered Tx:  [] No  [] Yes  Action:  Comments: Pt mentioned no pain today and that she recovered well after last session.      Objective:    Today’s Treatment:  Modalities: Vasocompression (B) Knees Med Pressure 10' in supine   Precautions:  Exercises:  Exercise Performed Reps/ Time Weight/ Level Comments   Nu Step xx 10' L3    Standing HS S xx 3x30\"     SB S xx 3x30\"            MAT          Seated Heel Slides   10x10\"     LAQ xx 3x10 ea 4# 2\" hold   Piriformis S  2x30\"            SLR xx 2x10 ea 4# 3\" eccentric   Clamshell  2x10 ea       SL hip abduction    2x10 ea        Bridge  15x3\"            Gym         STS xx 2x15     Standing HR/TR xx 2x15      HS curl/march   2x15  4#    Step taps  xx 10x2 ea 8\"    Step ups xx 2x15 ea 6\" Fwd start with R leg-   3-way hip xx 2x15 ea Blue     Lateral stepping laps xx 3L Blue  //   TKE  xx 15x5\" Blue     Other:     Specific Instructions for next treatment: Progressive LE AROM and strengthening per pt tolerance with focus on improving quad; vaso PRN for pain modulation.         Treatment Charges: Mins Units Time In/Out   []  Modalities        [x]  Ther Exercise 50 3    []

## 2025-01-30 ENCOUNTER — HOSPITAL ENCOUNTER (OUTPATIENT)
Dept: PHYSICAL THERAPY | Facility: CLINIC | Age: 65
Setting detail: THERAPIES SERIES
Discharge: HOME | End: 2025-01-30
Payer: COMMERCIAL

## 2025-01-30 PROCEDURE — 97110 THERAPEUTIC EXERCISES: CPT

## 2025-01-30 PROCEDURE — 97016 VASOPNEUMATIC DEVICE THERAPY: CPT

## 2025-01-30 NOTE — FLOWSHEET NOTE
3    []  Neuromuscular Re-ed      []  Gait Training      []  Manual Therapy      []  Ther Activities      []  Aquatics      [x]  Vasocompression 15 1    []  Cervical Traction      []  Other      Total Billable time 55 4           Assessment: [x] Progressing toward goals. Continued to progress LE strengthening and endurance with additional sets and reps. Pt demonstrates good tolerance. Concluded with vaso for soreness, will cont to progress as able.       [] No change.     [] Other:  [x] Patient would continue to benefit from skilled physical therapy services in order to: address the impairments and activity/ participation limitations with a focus on: restoring pain free range of motion, strength, and endurance with a gradual return to prior level of function.      STG: (to be met in 10 treatments)  ? Pain: Pt will report a decrease in pain to a level of 2-3/10 in order to improve tolerance to functional activity  ? ROM: Pt will demonstrate 100% of expected knee AROM without added pain bilaterally to improve gait mechanics  ? Strength: Pt will demonstrate atleast 4+/5 MMT grossly in order to improve ability to complete functional mobility  ? Function: Pt will complete a sit to stand 10 times without use of BUE support to improve functional mobility  Patient to be independent with home exercise program as demonstrated by performance with correct form without cues.  Demonstrate Knowledge of fall prevention    LTG: (to be met in 20 treatments)  Pt will improve LEFS to atleast 37/80 (MCID=9) to show improved tolerance to functional activity  Pt will grossly improve LE MMT to 5/5 bilat in order to demonstrate normalized strength  Pt will perform community ambulation without gross gait or balance deficits  Pt will navigate a full flight of steps in a step over step pattern in order to demonstrate improved strength and mobility to navigate community environments       Pt. Education:  [x] Yes  [] No  [x] Reviewed Prior

## 2025-02-03 ENCOUNTER — HOSPITAL ENCOUNTER (OUTPATIENT)
Dept: PHYSICAL THERAPY | Facility: CLINIC | Age: 65
Setting detail: THERAPIES SERIES
Discharge: HOME | End: 2025-02-03
Payer: COMMERCIAL

## 2025-02-03 NOTE — FLOWSHEET NOTE
[x] [facility]  Outpatient Rehabilitation &  Therapy  [address]  P: [phone]  F: [phone]     Therapy Cancel/No Show note    Date: 2/3/2025  Patient: Radha Foster  : 1960  MRN: [id number]    Cancels/No Shows to date:     For today's appointment patient:    [x]  Cancelled    [] Rescheduled appointment    [] No-show     Reason given by patient:    []  Patient ill    []  Conflicting appointment    [] No transportation      [] Conflict with work    [x] No reason given    [] Weather related    [] COVID-19    [] Other:      Comments:        [] Next appointment was confirmed    Electronically signed by: Dilip Hameed, PTA

## 2025-02-07 ENCOUNTER — HOSPITAL ENCOUNTER (OUTPATIENT)
Dept: PHYSICAL THERAPY | Facility: CLINIC | Age: 65
Setting detail: THERAPIES SERIES
Discharge: HOME | End: 2025-02-07
Payer: COMMERCIAL

## 2025-02-07 PROCEDURE — 97110 THERAPEUTIC EXERCISES: CPT

## 2025-02-07 PROCEDURE — 97016 VASOPNEUMATIC DEVICE THERAPY: CPT

## 2025-02-07 NOTE — FLOWSHEET NOTE
[x] [facility]  Outpatient Rehabilitation &  Therapy  [address]  P: [phone]  F: [phone]     Physical Therapy Daily Treatment Note    Date:  2025  Patient Name:  Radha Foster    :  1960  MRN: [id number]  Physician:     Milton Ordoñez PA   Insurance:  [employer]: alexis yr - 120 /120 vs remain, hard max, no combined; no auth req'd; no copay; ded met; coins 20%; oop 4350 / 2853 remain   Medical Diagnosis: M17.0 (ICD-10-CM) - Bilateral primary osteoarthritis of knee Rehab Codes: M17.0 (ICD-10-CM) - Bilateral primary osteoarthritis of knee   Onset date: 2024                       Next 's appt.: Pending     Visit# / total visits:     Cancels/No Shows: 2/0    Subjective:   Pain:  [] Yes  [x] No Location: B Knees  Pain Rating: (0-10 scale) 0/10  Pain altered Tx:  [] No  [] Yes  Action:  Comments: Pt reports she has been under the weather, but no inc issues with knees reported. Still recovering with overall endurance and energy levels. States she has been able to reduce the amount of pain medication she takes daily since starting therapy.     Objective:    Today’s Treatment:  Modalities: Vasocompression (B) Knees Med Pressure 10' in supine   Precautions:  Exercises:  Exercise Performed Reps/ Time Weight/ Level Comments   Nu Step x 10' L3    Standing HS S x 3x30\"     SB S x 3x30\"            MAT          Seated Heel Slides   10x10\"     LAQ x 3x10 ea 4# 2\" hold   Piriformis S x 2x30\"            SLR x 2x10 ea 4# 3\" eccentric   Clamshell  2x10 ea       SL hip abduction    2x10 ea        Bridge  15x3\"            Gym         STS x 2x15     Standing HR/TR x 2x15      HS curl/march  x 2x15  4#    Step taps  x 10x2  8\"    Step ups x 2x15 ea 8\"    3-way hip x 2x15 ea 4#     Lateral stepping laps x 3L Blue  //   TKE   15x5\" Blue     Other:     Specific Instructions for next treatment: Progressive LE AROM and strengthening per pt tolerance with focus on improving quad;

## 2025-02-10 ENCOUNTER — HOSPITAL ENCOUNTER (OUTPATIENT)
Dept: PHYSICAL THERAPY | Facility: CLINIC | Age: 65
Setting detail: THERAPIES SERIES
Discharge: HOME | End: 2025-02-10
Payer: COMMERCIAL

## 2025-02-10 PROCEDURE — 97016 VASOPNEUMATIC DEVICE THERAPY: CPT

## 2025-02-10 PROCEDURE — 97110 THERAPEUTIC EXERCISES: CPT

## 2025-02-10 NOTE — FLOWSHEET NOTE
[x] [facility]  Outpatient Rehabilitation &  Therapy  [address]  P: [phone]  F: [phone]     Physical Therapy Daily Treatment Note    Date:  2/10/2025  Patient Name:  Radha Foster    :  1960  MRN: [id number]  Physician:     Milton Ordoñez PA   Insurance:  [employer]: alexis yr - 120 /120 vs remain, hard max, no combined; no auth req'd; no copay; ded met; coins 20%; oop 4350 / 2853 remain   Medical Diagnosis: M17.0 (ICD-10-CM) - Bilateral primary osteoarthritis of knee Rehab Codes: M17.0 (ICD-10-CM) - Bilateral primary osteoarthritis of knee   Onset date: 2024                       Next 's appt.: Pending     Visit# / total visits:     Cancels/No Shows: 2/0    Subjective:   Pain:  [] Yes  [x] No Location: B Knees  Pain Rating: (0-10 scale) 0/10  Pain altered Tx:  [] No  [] Yes  Action:  Comments: Recovered since having the flu. Knees are doing better and reports decreased inflammation    Objective:    Today’s Treatment:  Modalities: Vasocompression (B) Knees Med Pressure 10' in supine   Precautions:  Exercises:  Exercise Performed Reps/ Time Weight/ Level Comments   Nu Step x 10' L3    Standing HS S x 3x30\"     SB S x 3x30\"            MAT          Seated Heel Slides   10x10\"     LAQ x 3x10 ea 4# 2\" hold   Piriformis S x 2x30\"            SLR  2x10 ea 4# 3\" eccentric   Clamshell  2x10 ea       SL hip abduction    2x10 ea        Bridge  15x3\"            Gym         STS x 2x15     Standing HR/TR x 2x15      HS curl/march  x 2x15  4#    Step taps  x 2x15 8\"    Step ups  2x15 ea 8\"    3-way hip x 2x15 ea 4#     Lateral stepping laps  3L Blue  //   TKE  x 15x5\" Blue     Other:     Specific Instructions for next treatment: Progressive LE AROM and strengthening per pt tolerance with focus on improving quad; vaso PRN for pain modulation.         Treatment Charges: Mins Units Time In/Out   []  Modalities        [x]  Ther Exercise 45 3    []  Neuromuscular Re-ed

## 2025-02-14 ENCOUNTER — HOSPITAL ENCOUNTER (OUTPATIENT)
Dept: PHYSICAL THERAPY | Facility: CLINIC | Age: 65
Setting detail: THERAPIES SERIES
Discharge: HOME | End: 2025-02-14
Payer: COMMERCIAL

## 2025-02-14 PROCEDURE — 97110 THERAPEUTIC EXERCISES: CPT

## 2025-02-14 PROCEDURE — 97016 VASOPNEUMATIC DEVICE THERAPY: CPT

## 2025-02-14 NOTE — FLOWSHEET NOTE
[x] [facility]  Outpatient Rehabilitation &  Therapy  [address]  P: [phone]  F: [phone]     Physical Therapy Daily Treatment Note    Date:  2025  Patient Name:  Radha Foster    :  1960  MRN: [id number]  Physician:     Milton Ordoñez PA   Insurance:  [employer]: alexis yr - 120 /120 vs remain, hard max, no combined; no auth req'd; no copay; ded met; coins 20%; oop 4350 / 2853 remain   Medical Diagnosis: M17.0 (ICD-10-CM) - Bilateral primary osteoarthritis of knee Rehab Codes: M17.0 (ICD-10-CM) - Bilateral primary osteoarthritis of knee   Onset date: 2024                       Next 's appt.: Pending     Visit# / total visits: 15/20    Cancels/No Shows: 2/0    Subjective:   Pain:  [] Yes  [x] No Location: B Knees  Pain Rating: (0-10 scale) 0/10  Pain altered Tx:  [] No  [] Yes  Action:  Comments: Pt reports some soreness in knees d/t weather flux.     Objective:    Today’s Treatment:  Modalities: Vasocompression (B) Knees Med Pressure 10' in supine   Precautions:  Exercises:  Exercise Performed Reps/ Time Weight/ Level Comments   Nu Step x 10' L3    Standing HS S x 3x30\"     SB S x 3x30\"            MAT          Seated Heel Slides   10x10\"     LAQ x 3x10 ea 4# 2\" hold   Piriformis S x 2x30\"            SLR  2x10 ea 4# 3\" eccentric   Clamshell  2x10 ea       SL hip abduction    2x10 ea        Bridge  15x3\"            Gym         STS x 2x15     Standing HR/TR x 2x15      HS curl/march  x 2x15  4#    Step taps  x 2x15 8\"    Step ups x 20 ea 8\" Forward/lateral   Heel tap x 10 ea 4\"    3-way hip x 2x15 ea Blue    Lateral stepping laps x 3L Blue  //   TKE  x 20x5\" Blue     Other:     Specific Instructions for next treatment: Progressive LE AROM and strengthening per pt tolerance with focus on improving quad; vaso PRN for pain modulation.         Treatment Charges: Mins Units Time In/Out   []  Modalities        [x]  Ther Exercise 45 3    []  Neuromuscular Re-ed

## 2025-02-17 ENCOUNTER — HOSPITAL ENCOUNTER (OUTPATIENT)
Dept: PHYSICAL THERAPY | Facility: CLINIC | Age: 65
Setting detail: THERAPIES SERIES
Discharge: HOME | End: 2025-02-17
Payer: COMMERCIAL

## 2025-02-17 PROCEDURE — 97016 VASOPNEUMATIC DEVICE THERAPY: CPT

## 2025-02-17 PROCEDURE — 97110 THERAPEUTIC EXERCISES: CPT

## 2025-02-17 NOTE — FLOWSHEET NOTE
[x] [facility]  Outpatient Rehabilitation &  Therapy  [address]  P: [phone]  F: [phone]     Physical Therapy Daily Treatment Note    Date:  2025  Patient Name:  Radha Foster    :  1960  MRN: [id number]  Physician:     Milton Ordoñez PA   Insurance:  [employer]: alexis yr - 120 /120 vs remain, hard max, no combined; no auth req'd; no copay; ded met; coins 20%; oop 4350 / [id number] remain   Medical Diagnosis: M17.0 (ICD-10-CM) - Bilateral primary osteoarthritis of knee Rehab Codes: M17.0 (ICD-10-CM) - Bilateral primary osteoarthritis of knee   Onset date: 2024                       Next 's appt.: Pending     Visit# / total visits:     Cancels/No Shows: 2/0    Subjective:   Pain:  [] Yes  [x] No Location: B Knees  Pain Rating: (0-10 scale) 0/10  Pain altered Tx:  [] No  [] Yes  Action:  Comments: Right sciatic symptoms from shoveling yesterday    Objective:    Today’s Treatment:  Modalities: Vasocompression (B) Knees Med Pressure 10' in supine   Precautions:  Exercises:  Exercise Performed Reps/ Time Weight/ Level Comments   Nu Step x 10' L3    Standing HS S x 3x30\"     SB S x 3x30\"            MAT          Seated Heel Slides   10x10\"     LAQ  3x10 ea 4# 2\" hold   Piriformis S x 2x30\"            SLR  2x10 ea 4# 3\" eccentric   Clamshell  2x10 ea       SL hip abduction    2x10 ea        Bridge  15x3\"            Gym         STS x 2x15     Standing HR/TR x 2x15      HS curl/march  x 2x15  4#    Step taps  x 2x15 8\"    Step ups x 20 ea 6\" Forward/lateral   Heel tap  10 ea 4\"    3-way hip x 2x15 ea Blue    Lateral stepping laps x 3L Blue  //   TKE  x 20x5\" Blue     Other:     Specific Instructions for next treatment: Progressive LE AROM and strengthening per pt tolerance with focus on improving quad; vaso PRN for pain modulation.         Treatment Charges: Mins Units Time In/Out   []  Modalities        [x]  Ther Exercise 40 3 [id number]   []  Neuromuscular Re-ed

## 2025-02-21 ENCOUNTER — HOSPITAL ENCOUNTER (OUTPATIENT)
Dept: PHYSICAL THERAPY | Facility: CLINIC | Age: 65
Setting detail: THERAPIES SERIES
Discharge: HOME | End: 2025-02-21
Payer: COMMERCIAL

## 2025-02-21 PROCEDURE — 97110 THERAPEUTIC EXERCISES: CPT

## 2025-02-21 PROCEDURE — 97016 VASOPNEUMATIC DEVICE THERAPY: CPT

## 2025-02-21 NOTE — FLOWSHEET NOTE
[x] [facility]  Outpatient Rehabilitation &  Therapy  [address]  [address]  P: [phone]  F: [phone]     Physical Therapy Daily Treatment Note    Date:  2025  Patient Name:  Radha Foster    :  1960  MRN: [id number]  Physician:     Milton Ordoñez PA   Insurance:  [employer]: alexis yr - 120 /120 vs remain, hard max, no combined; no auth req'd; no copay; ded met; coins 20%; oop 4350 / [id number] remain   Medical Diagnosis: M17.0 (ICD-10-CM) - Bilateral primary osteoarthritis of knee Rehab Codes: M17.0 (ICD-10-CM) - Bilateral primary osteoarthritis of knee   Onset date: 2024                       Next 's appt.: Pending     Visit# / total visits:     Cancels/No Shows: 2/0    Subjective:   Pain:  [] Yes  [x] No Location: B Knees  Pain Rating: (0-10 scale) 0/10  Pain altered Tx:  [] No  [] Yes  Action:  Comments: Pt reports no pain or complaints.     Objective:    Today’s Treatment:  Modalities: Vasocompression (B) Knees Med Pressure 10' in supine   Precautions:  Exercises:  Exercise Performed Reps/ Time Weight/ Level Comments   Scifit bike x 8'     Standing HS S x 3x30\"     SB S x 3x30\"            MAT          Seated Heel Slides  x 20x3\"     LAQ x 20 ea 5# 2\" hold   Piriformis S x 2x30\"            SLR  2x10 ea 4# 3\" eccentric   Clamshell  2x10 ea       SL hip abduction    2x10 ea        Bridge  15x3\"            Gym         STS x 2x15     Standing HR/TR x 2x15      HS curl/march  x 2x15  5#    Step taps  x 2x15 8\"    Step ups x 20 ea 8\" Forward/lateral   Heel tap  10 ea 4\"    3-way hip x 2x15 ea plum    Lateral stepping laps x 3x10 steps plum //   TKE   20x5\" Blue     Other:     Specific Instructions for next treatment: Progressive LE AROM and strengthening per pt tolerance with focus on improving quad; vaso PRN for pain modulation.         Treatment Charges: Mins Units Time In/Out   []  Modalities        [x]  Ther Exercise 40 3 [id number]   []  Neuromuscular Re-ed      []

## 2025-02-24 ENCOUNTER — HOSPITAL ENCOUNTER (OUTPATIENT)
Dept: PHYSICAL THERAPY | Facility: CLINIC | Age: 65
Setting detail: THERAPIES SERIES
Discharge: HOME | End: 2025-02-24
Payer: COMMERCIAL

## 2025-02-24 PROCEDURE — 97016 VASOPNEUMATIC DEVICE THERAPY: CPT

## 2025-02-24 PROCEDURE — 97110 THERAPEUTIC EXERCISES: CPT

## 2025-02-24 NOTE — FLOWSHEET NOTE
[x] [facility]  Outpatient Rehabilitation &  Therapy  [address]  P: [phone]  F: [phone]     Physical Therapy Daily Treatment Note    Date:  2025  Patient Name:  Radha Foster    :  1960  MRN: [id number]  Physician:     Milton Ordoñez PA   Insurance:  [employer]: alexis yr - 120 /120 vs remain, hard max, no combined; no auth req'd; no copay; ded met; coins 20%; oop 4350 / 2853 remain   Medical Diagnosis: M17.0 (ICD-10-CM) - Bilateral primary osteoarthritis of knee Rehab Codes: M17.0 (ICD-10-CM) - Bilateral primary osteoarthritis of knee   Onset date: 2024                       Next 's appt.: Pending     Visit# / total visits:     Cancels/No Shows: 2/0    Subjective:   Pain:  [] Yes  [x] No Location: B Knees  Pain Rating: (0-10 scale) 0/10  Pain altered Tx:  [] No  [] Yes  Action:  Comments: Pt mentioned that she prefers the NuStep over the bike d/t fear of falling.      Objective:    Today’s Treatment:  Modalities: Vasocompression (B) Knees Med Pressure 10' in supine   Precautions:  Exercises:  Exercise Performed Reps/ Time Weight/ Level Comments   NuStep xx 10'     Standing HS S xx 3x30\"     SB S xx 3x30\"            MAT          Seated Heel Slides   20x3\"     LAQ xx 20 ea 5# 2\" hold   Piriformis S  2x30\"            SLR HEP 2x10 ea 4# 3\" eccentric   Clamshell HEP 2x10 ea       SL hip abduction  HEP 2x10 ea        Bridge HEP 15x3\"            Gym         STS xx 2x15     Standing HR/TR xx 2x15      HS curl/march  xx 2x15  5#    Step taps  xx 2x15 8\"    Step ups xx 20 ea 8\" Forward only today 25   Heel tap N/T 10 ea 4\"    3-way hip xx 2x15 ea plum    Lateral stepping laps xx 3x10 steps plum //   TKE  N/T 20x5\" Blue     Other:     Specific Instructions for next treatment: Progressive LE AROM and strengthening per pt tolerance with focus on improving quad; vaso PRN for pain modulation.         Treatment Charges: Mins Units Time In/Out   []  Modalities

## 2025-02-28 ENCOUNTER — HOSPITAL ENCOUNTER (OUTPATIENT)
Dept: PHYSICAL THERAPY | Facility: CLINIC | Age: 65
Setting detail: THERAPIES SERIES
Discharge: HOME | End: 2025-02-28
Payer: COMMERCIAL

## 2025-02-28 PROCEDURE — 97110 THERAPEUTIC EXERCISES: CPT

## 2025-02-28 PROCEDURE — 97016 VASOPNEUMATIC DEVICE THERAPY: CPT

## 2025-02-28 NOTE — FLOWSHEET NOTE
[x] [facility]  Outpatient Rehabilitation &  Therapy  [address]  P: [phone]  F: [phone]     Physical Therapy Daily Treatment Note    Date:  2025  Patient Name:  Radha Foster    :  1960  MRN: [id number]  Physician:     Milton Ordoñez PA   Insurance:  [employer]: alexis yr - 120 /120 vs remain, hard max, no combined; no auth req'd; no copay; ded met; coins 20%; oop 4350 / [id number] remain   Medical Diagnosis: M17.0 (ICD-10-CM) - Bilateral primary osteoarthritis of knee Rehab Codes: M17.0 (ICD-10-CM) - Bilateral primary osteoarthritis of knee   Onset date: 2024                       Next 's appt.: Pending     Visit# / total visits:     Cancels/No Shows: 2/0    Subjective:   Pain:  [] Yes  [x] No Location: B Knees  Pain Rating: (0-10 scale) 0/10  Pain altered Tx:  [] No  [] Yes  Action:  Comments: Pt reports some weather related soreness this afternoon.     Objective:    Today’s Treatment:  Modalities: Vasocompression (B) Knees Med Pressure 10' in supine   Precautions:  Exercises:  Exercise Performed Reps/ Time Weight/ Level Comments   NuStep x 10'     Standing HS S x 3x30\"     SB S x 3x30\"            MAT          Seated Heel Slides   20x3\"     LAQ x 20 ea 5# 2\" hold   Piriformis S  2x30\"            SLR HEP 2x10 ea 4# 3\" eccentric   Clamshell HEP 2x10 ea       SL hip abduction  HEP 2x10 ea        Bridge HEP 15x3\"            Gym         STS x 2x15     Standing HR/TR x 2x15      HS curl/march  x 2x15  5#    Shar taps   x 10 ea 12\" Fwd, lateral    Step ups x 30 ea 8\" Forward only   Step down  x 20 ea 4\"    Heel tap x 10 ea 4\"    3-way hip x 2x15 ea 5#    Lateral stepping laps x 3x10 steps plum //   TKE  N/T 20x5\" Blue     Other:     Specific Instructions for next treatment: Progressive LE AROM and strengthening per pt tolerance with focus on improving quad; vaso PRN for pain modulation.         Treatment Charges: Mins Units Time In/Out   []  Modalities

## 2025-03-07 ENCOUNTER — HOSPITAL ENCOUNTER (OUTPATIENT)
Dept: PHYSICAL THERAPY | Facility: CLINIC | Age: 65
Setting detail: THERAPIES SERIES
Discharge: HOME | End: 2025-03-07
Payer: COMMERCIAL

## 2025-03-07 PROCEDURE — 97110 THERAPEUTIC EXERCISES: CPT

## 2025-03-07 NOTE — THERAPY DISCHARGE
Last refill shows 2 different sigs.  Please advise if patient should be taking lisinopril 10mg once or twice a day extension         L3- Knee extension 5 5     Lumbar rotation         L4- Ankle DF 5 5     Lumbar lateral flexion         L5- EHL 5 5               S1- Ankle PF < 5 < 5               S2- Knee flexion 5 5     *Denotes pain with testing  MMT grossly decreased due to disuse     Functional Test: LEFS  Score (12/12/24): 28/80= 65% functionally impaired  Score (03/07/25): 51/80= 36.25% functionally impaired    Assessment: Progressing toward goals. Ms. Foster is a pleasant 65 y/o female who has now attended 20 visits for bilateral knee pain.  She has demonstrated improvements to her functional status including: decreased pain levels, increased strength, increased endurance, and improved functional mobility.  She is able to navigate the stairs without difficulty and has returned to working 3 days per week.  She reports that at this time she is completely independent with her HEP and would like to be discharged from her plan of care at this time.   STG: (to be met in 10 treatments)  ? Pain: Pt will report a decrease in pain to a level of 2-3/10 in order to improve tolerance to functional activity MET  ? ROM: Pt will demonstrate 100% of expected knee AROM without added pain bilaterally to improve gait mechanics MET  ? Strength: Pt will demonstrate atleast 4+/5 MMT grossly in order to improve ability to complete functional mobility MET  ? Function: Pt will complete a sit to stand 10 times without use of BUE support to improve functional mobility MET  Patient to be independent with home exercise program as demonstrated by performance with correct form without cues. MET  Demonstrate Knowledge of fall prevention MET     LTG: (to be met in 20 treatments)  Pt will improve LEFS to atleast 37/80 (MCID=9) to show improved tolerance to functional activity MET  Pt will grossly improve LE MMT to 5/5 bilat in order to demonstrate normalized strength MET  Pt will perform community ambulation without gross gait or balance deficits MET  Pt

## 2025-03-07 NOTE — FLOWSHEET NOTE
weekly - 3 sets - 10 reps - 2 hold  - Seated Long Arc Quad  - 1 x daily - 7 x weekly - 3 sets - 10 reps - 5 hold  - Supine Active Straight Leg Raise  - 1 x daily - 7 x weekly - 2 sets - 10 reps - 3 hold  - Sit to Stand with Armchair  - 1 x daily - 7 x weekly - 2 sets - 10 reps  - Clamshell  - 1 x daily - 7 x weekly - 2 sets - 10 reps     Patient Education  - Knee Osteoarthritis    Plan: [x] Continue current frequency toward long and short term goals.    [x] Specific Instructions for subsequent treatments: Discharged      Time In: 12:57 pm          Time Out:  1:52 pm    Electronically signed by:  MEG KIM PT

## 2025-03-08 ENCOUNTER — HOSPITAL ENCOUNTER (OUTPATIENT)
Dept: WOMENS IMAGING | Age: 65
Discharge: HOME | End: 2025-03-10
Payer: COMMERCIAL

## 2025-03-08 DIAGNOSIS — Z12.31 ENCOUNTER FOR SCREENING MAMMOGRAM FOR BREAST CANCER: ICD-10-CM

## 2025-03-08 PROCEDURE — 77067 SCR MAMMO BI INCL CAD: CPT

## 2025-07-28 ENCOUNTER — HOSPITAL ENCOUNTER (OUTPATIENT)
Age: 65
Discharge: HOME | End: 2025-07-28
Payer: COMMERCIAL

## 2025-07-28 DIAGNOSIS — I10 HYPERTENSION, BENIGN: ICD-10-CM

## 2025-07-28 LAB
ALBUMIN SERPL-MCNC: 4.1 G/DL (ref 3.5–5.2)
ALBUMIN/GLOB SERPL: 1.1 {RATIO} (ref 1–2.5)
ALP SERPL-CCNC: 105 U/L (ref 35–104)
ALT SERPL-CCNC: 12 U/L (ref 10–35)
ANION GAP SERPL CALCULATED.3IONS-SCNC: 12 MMOL/L (ref 9–16)
AST SERPL-CCNC: 11 U/L (ref 10–35)
BASOPHILS # BLD: 0.09 K/UL (ref 0–0.2)
BASOPHILS NFR BLD: 1 % (ref 0–2)
BILIRUB SERPL-MCNC: 0.4 MG/DL (ref 0–1.2)
BUN SERPL-MCNC: 15 MG/DL (ref 8–23)
CALCIUM SERPL-MCNC: 11.6 MG/DL (ref 8.6–10.4)
CHLORIDE SERPL-SCNC: 102 MMOL/L (ref 98–107)
CHOLEST SERPL-MCNC: 169 MG/DL (ref 0–199)
CHOLESTEROL/HDL RATIO: 3.1
CO2 SERPL-SCNC: 24 MMOL/L (ref 20–31)
CREAT SERPL-MCNC: 0.6 MG/DL (ref 0.6–0.9)
EOSINOPHIL # BLD: 0.17 K/UL (ref 0–0.44)
EOSINOPHILS RELATIVE PERCENT: 1 % (ref 1–4)
ERYTHROCYTE [DISTWIDTH] IN BLOOD BY AUTOMATED COUNT: 15.1 % (ref 11.8–14.4)
GFR, ESTIMATED: >90 ML/MIN/1.73M2
GLUCOSE SERPL-MCNC: 111 MG/DL (ref 74–99)
HCT VFR BLD AUTO: 37.7 % (ref 36.3–47.1)
HDLC SERPL-MCNC: 55 MG/DL
HGB BLD-MCNC: 11.5 G/DL (ref 11.9–15.1)
IMM GRANULOCYTES # BLD AUTO: 0.04 K/UL (ref 0–0.3)
IMM GRANULOCYTES NFR BLD: 0 %
LDLC SERPL CALC-MCNC: 99 MG/DL (ref 0–100)
LYMPHOCYTES NFR BLD: 2.02 K/UL (ref 1.1–3.7)
LYMPHOCYTES RELATIVE PERCENT: 16 % (ref 24–43)
MCH RBC QN AUTO: 24.9 PG (ref 25.2–33.5)
MCHC RBC AUTO-ENTMCNC: 30.5 G/DL (ref 28.4–34.8)
MCV RBC AUTO: 81.6 FL (ref 82.6–102.9)
MONOCYTES NFR BLD: 0.95 K/UL (ref 0.1–1.2)
MONOCYTES NFR BLD: 7 % (ref 3–12)
NEUTROPHILS NFR BLD: 75 % (ref 36–65)
NEUTS SEG NFR BLD: 9.78 K/UL (ref 1.5–8.1)
NRBC BLD-RTO: 0 PER 100 WBC
PLATELET # BLD AUTO: 441 K/UL (ref 138–453)
PMV BLD AUTO: 9.9 FL (ref 8.1–13.5)
POTASSIUM SERPL-SCNC: 3.9 MMOL/L (ref 3.7–5.3)
PROT SERPL-MCNC: 7.8 G/DL (ref 6.6–8.7)
RBC # BLD AUTO: 4.62 M/UL (ref 3.95–5.11)
RBC # BLD: ABNORMAL 10*6/UL
SODIUM SERPL-SCNC: 138 MMOL/L (ref 136–145)
TRIGL SERPL-MCNC: 77 MG/DL
VLDLC SERPL CALC-MCNC: 15 MG/DL (ref 1–30)
WBC OTHER # BLD: 13.1 K/UL (ref 3.5–11.3)

## 2025-07-28 PROCEDURE — 36415 COLL VENOUS BLD VENIPUNCTURE: CPT

## 2025-07-28 PROCEDURE — 85025 COMPLETE CBC W/AUTO DIFF WBC: CPT

## 2025-07-28 PROCEDURE — 80061 LIPID PANEL: CPT

## 2025-07-28 PROCEDURE — 80053 COMPREHEN METABOLIC PANEL: CPT

## 2025-08-07 ENCOUNTER — HOSPITAL ENCOUNTER (OUTPATIENT)
Age: 65
Discharge: HOME | End: 2025-08-07
Payer: COMMERCIAL

## 2025-08-07 DIAGNOSIS — E83.52 HYPERCALCEMIA: ICD-10-CM

## 2025-08-07 LAB
25(OH)D3 SERPL-MCNC: 30.9 NG/ML (ref 30–100)
CA-I BLD-SCNC: 1.53 MMOL/L (ref 1.13–1.33)
CALCIUM SERPL-MCNC: 12 MG/DL (ref 8.6–10.4)
PTH-INTACT SERPL-MCNC: 92 PG/ML (ref 17.9–58.6)

## 2025-08-07 PROCEDURE — 36415 COLL VENOUS BLD VENIPUNCTURE: CPT

## 2025-08-07 PROCEDURE — 83970 ASSAY OF PARATHORMONE: CPT

## 2025-08-07 PROCEDURE — 82330 ASSAY OF CALCIUM: CPT

## 2025-08-07 PROCEDURE — 82310 ASSAY OF CALCIUM: CPT

## 2025-08-07 PROCEDURE — 82306 VITAMIN D 25 HYDROXY: CPT

## (undated) DEVICE — ENDO KIT W/SYRINGE: Brand: MEDLINE INDUSTRIES, INC.

## (undated) DEVICE — BITEBLOCK 54FR W/ DENT RIM BLOX

## (undated) DEVICE — FORCEPS BX L240CM JAW DIA2.8MM L CAP W/ NDL MIC MESH TOOTH

## (undated) DEVICE — DEFENDO AIR WATER SUCTION AND BIOPSY VALVE KIT FOR  OLYMPUS: Brand: DEFENDO AIR/WATER/SUCTION AND BIOPSY VALVE

## (undated) DEVICE — GLOVE ORANGE PI 7 1/2   MSG9075

## (undated) DEVICE — FORCEPS BX L240CM JAW DIA2.4MM ORNG L CAP W/ NDL DISP RAD